# Patient Record
Sex: FEMALE | Race: WHITE | NOT HISPANIC OR LATINO | URBAN - METROPOLITAN AREA
[De-identification: names, ages, dates, MRNs, and addresses within clinical notes are randomized per-mention and may not be internally consistent; named-entity substitution may affect disease eponyms.]

---

## 2022-07-05 ENCOUNTER — EMERGENCY (EMERGENCY)
Facility: HOSPITAL | Age: 27
LOS: 1 days | Discharge: HOME | End: 2022-07-05
Attending: EMERGENCY MEDICINE | Admitting: EMERGENCY MEDICINE

## 2022-07-05 VITALS
TEMPERATURE: 98 F | RESPIRATION RATE: 16 BRPM | HEART RATE: 74 BPM | DIASTOLIC BLOOD PRESSURE: 87 MMHG | SYSTOLIC BLOOD PRESSURE: 142 MMHG | OXYGEN SATURATION: 98 %

## 2022-07-05 VITALS
WEIGHT: 125 LBS | RESPIRATION RATE: 18 BRPM | DIASTOLIC BLOOD PRESSURE: 84 MMHG | TEMPERATURE: 98 F | SYSTOLIC BLOOD PRESSURE: 136 MMHG | HEART RATE: 71 BPM | OXYGEN SATURATION: 100 %
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
## 2022-07-05 DIAGNOSIS — R51.9 HEADACHE, UNSPECIFIED: ICD-10-CM
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
## 2022-07-05 DIAGNOSIS — F07.81 POSTCONCUSSIONAL SYNDROME: ICD-10-CM

## 2022-07-05 DIAGNOSIS — Z91.010 ALLERGY TO PEANUTS: ICD-10-CM

## 2022-07-05 PROCEDURE — 99284 EMERGENCY DEPT VISIT MOD MDM: CPT

## 2022-07-05 PROCEDURE — 70450 CT HEAD/BRAIN W/O DYE: CPT | Mod: 26

## 2022-07-05 RX ORDER — ACETAMINOPHEN 500 MG
975 TABLET ORAL ONCE
Refills: 0 | Status: COMPLETED | OUTPATIENT
Start: 2022-07-05 | End: 2022-07-05

## 2022-07-05 RX ADMIN — Medication 975 MILLIGRAM(S): at 19:39

## 2022-07-05 NOTE — ED PROVIDER NOTE - PROGRESS NOTE DETAILS
Pt signed out to me pending CT head read:  CT head negative for bleed.  Stable for dc.  Veronika Dent MD

## 2022-07-05 NOTE — ED PROVIDER NOTE - PATIENT PORTAL LINK FT
You can access the FollowMyHealth Patient Portal offered by [facility] by registering at the following website: [email or web address]. By joining [facility]’s FollowMyHealth portal, you will also be able to view your health information using other applications (apps) compatible with our system.

## 2022-07-05 NOTE — ED ADULT NURSE NOTE - CHIEF COMPLAINT QUOTE
Continue taking your pain medications as prescribed.    Follow-up with Orthopedics and your pain management specialist as discussed.    Return to the emergency department for any new or worsening symptoms or as needed for any additional concerns.   pt c/o concussive symptoms s/p hitting her head 4 days ago

## 2022-07-05 NOTE — ED ADULT TRIAGE NOTE - GLASGOW COMA SCALE: EYE OPENING, MLM
How Severe Is Your Acne?: moderate Is This A New Presentation, Or A Follow-Up?: Acne (E4) spontaneous

## 2022-07-05 NOTE — ED PROVIDER NOTE - NSFOLLOWUPINSTRUCTIONS_ED_ALL_ED_FT
Concussion, Adult    A series of images showing how the quick head movements of a concussion injure the brain.   A concussion is a brain injury from a hard, direct hit (trauma) to the head or body. This direct hit causes the brain to shake quickly back and forth inside the skull. This can damage brain cells and cause chemical changes in the brain. A concussion may also be known as a mild traumatic brain injury (TBI).    Concussions are usually not life-threatening, but the effects of a concussion can be serious. If you have a concussion, you should be very careful to avoid having a second concussion.      What are the causes?    This condition is caused by:•A direct hit to your head, such as:  •Running into another player during a game.      •Being hit in a fight.      •Hitting your head on a hard surface.        •Sudden movement of your body that causes your brain to move back and forth inside the skull, such as in a car crash.        What are the signs or symptoms?    The signs of a concussion can be hard to notice. Early on, they may be missed by you, family members, and health care providers. You may look fine on the outside but may act or feel differently.    Every head injury is different. Symptoms are usually temporary but may last for days, weeks, or even months. Some symptoms appear right away, but other symptoms may not show up for hours or days. If your symptoms last longer than normal, you may have post-concussion syndrome.    Physical symptoms     •Headaches.      •Dizziness and problems with coordination or balance.      •Sensitivity to light or noise.      •Nausea or vomiting.      •Tiredness (fatigue).      •Vision or hearing problems.      •Changes in eating or sleeping patterns.      •Seizure.      Mental and emotional symptoms     •Irritability or mood changes.      •Memory problems.      •Trouble concentrating, organizing, or making decisions.      •Slowness in thinking, acting or reacting, speaking, or reading.      •Anxiety or depression.        How is this diagnosed?    This condition is diagnosed based on:  •Your symptoms.      •A description of your injury.      You may also have tests, including:  •Imaging tests, such as a CT scan or an MRI.      •Neuropsychological tests. These measure your thinking, understanding, learning, and remembering abilities.        How is this treated?    Treatment for this condition includes:•Stopping sports or activity if you are injured. If you hit your head or show signs of concussion:   •Do not return to sports or activities the same day.       •Get checked by a health care provider before you return to your activities.        •Physical and mental rest and careful observation, usually at home. Gradually return to your normal activities.    •Medicines to help with symptoms such as headaches, nausea, or difficulty sleeping.  •Avoid taking opioid pain medicine while recovering from a concussion.        •Avoiding alcohol and drugs. These may slow your recovery and can put you at risk of further injury.      •Referral to a concussion clinic or rehabilitation center.      Recovery from a concussion can take time. How fast you recover depends on many factors. Return to activities only when:  •Your symptoms are completely gone.      •Your health care provider says that it is safe.        Follow these instructions at home:    Activity   •Limit activities that require a lot of thought or concentration, such as:  •Doing homework or job-related work.      •Watching TV.      •Working on the computer or phone.      •Playing memory games and puzzles.      •Rest. Rest helps your brain heal. Make sure you:  •Get plenty of sleep. Most adults should get 7–9 hours of sleep each night.      •Rest during the day. Take naps or rest breaks when you feel tired.        •Avoid physical activity like exercise until your health care provider says it is safe. Stop any activity that worsens symptoms.      • Do not do high-risk activities that could cause a second concussion, such as riding a bike or playing sports.      •Ask your health care provider when you can return to your normal activities, such as school, work, athletics, and driving. Your ability to react may be slower after a brain injury. Never do these activities if you are dizzy. Your health care provider will likely give you a plan for gradually returning to activities.        General instructions   A bottle of beer, a glass of wine, and a glass of hard liquor with a "do not drink" sign over them.    •Take over-the-counter and prescription medicines only as told by your health care provider. Some medicines, such as blood thinners (anticoagulants) and aspirin, may increase the risk for complications, such as bleeding.      • Do not drink alcohol until your health care provider says you can.      •Watch your symptoms and tell others around you to do the same. Complications sometimes occur after a concussion. Older adults with a brain injury may have a higher risk of serious complications.      •Tell your , teachers, school nurse, school counselor, , or  about your injury, symptoms, and restrictions.      •Keep all follow-up visits as told by your health care provider. This is important.        How is this prevented?    Avoiding another brain injury is very important. In rare cases, another injury can lead to permanent brain damage, brain swelling, or death. The risk of this is greatest during the first 7–10 days after a head injury. Avoid injuries by:  •Stopping activities that could lead to a second concussion, such as contact or recreational sports, until your health care provider says it is okay.    •Taking these actions once you have returned to sports or activities:  •Avoiding plays or moves that can cause you to crash into another person. This is how most concussions occur.      •Following the rules and being respectful of other players. Do not engage in violent or illegal plays.        •Getting regular exercise that includes strength and balance training.      •Wearing a properly fitting helmet during sports, biking, or other activities. Helmets can help protect you from serious skull and brain injuries, but they may not protect you from a concussion. Even when wearing a helmet, you should avoid being hit in the head.        Contact a health care provider if:    •Your symptoms do not improve.      •You have new symptoms.      •You have another injury.        Get help right away if:  •You have new or worsening physical symptoms, such as:  •A severe or worsening headache.      •Weakness or numbness in any part of your body, slurred speech, vision changes, or confusion.      •Your coordination gets worse.      •Vomiting repeatedly.      •You have a seizure.      •You have unusual behavior changes.      •You lose consciousness, are sleepier than normal, or are difficult to wake up.        These symptoms may represent a serious problem that is an emergency. Do not wait to see if the symptoms will go away. Get medical help right away. Call your local emergency services (911 in the [place] Do not drive yourself to the hospital.       Summary    •A concussion is a brain injury that results from a hard, direct hit (trauma) to your head or body.      •You may have imaging tests and neuropsychological tests to diagnose a concussion.      •Treatment for this condition includes physical and mental rest and careful observation.      •Ask your health care provider when you can return to your normal activities, such as school, work, athletics, and driving.       •Get help right away if you have a severe headache, weakness in any part of the body, seizures, behavior changes, changes in vision, or if you are confused or sleepier than normal.

## 2022-07-05 NOTE — ED PROVIDER NOTE - PHYSICAL EXAMINATION
VITAL SIGNS: I have reviewed nursing notes and confirm.  CONSTITUTIONAL: Well-developed; well-nourished; in no acute distress.  SKIN: Skin exam is warm and dry, no acute rash.  HEAD: Normocephalic; + mild occipital TTP w/out skeletal abnormalities or ecchymoses/hematoma  EYES: PERRL, EOM intact; conjunctiva and sclera clear.  ENT: No nasal discharge; airway clear.  NECK: Supple; non tender.  CARD:  Regular rate and rhythm.  RESP: Unlabored.   EXT: No deformities  NEURO: Alert, oriented. Grossly unremarkable.  PSYCH: Cooperative, appropriate.

## 2022-07-05 NOTE — ED ADULT NURSE NOTE - CAS DISCH TRANSFER METHOD
Called the patient and informed them of upcoming appointment. Patient was asked to arrive 20 minutes early in the ortho department to obtain x-rays.      Walking

## 2022-07-05 NOTE — ED ADULT NURSE NOTE - OBJECTIVE STATEMENT
pt. c/o feeling of dizziness and headache on and off since hitting her head on Thursday 6/30. Pt. denies LOC, nausea or vomiting.

## 2022-07-05 NOTE — ED PROVIDER NOTE - OBJECTIVE STATEMENT
27 y/o F w/no sig pmhx p/w occipital HA and intermittent dizziness/lightheadedness with some photosensitivity ongoing for 2 days after a head injury in which pt fell backward hitting the back of her head on a metal railing walking into an apt building. No LOC. No vomiting. Pt's sx began to manifest about 24hrs after the injury. Pt discussed with her PMD and was advised to report to the ER for CT and evaluation. No focal numbness/tingling/weakness. No neck pain. No other injuries.

## 2022-07-05 NOTE — ED PROVIDER NOTE - CLINICAL SUMMARY MEDICAL DECISION MAKING FREE TEXT BOX
Pending CT Head. Pt is neuro intact, well appearing. discussed signs/sx of concussion and duration of syndrome. given f/u with neuro and anticipatory guidance/supportive care instructions.

## 2022-07-05 NOTE — ED PROVIDER NOTE - HISTORY ATTESTATION, MLM
The patient noted   4  months   ago   the onset of   moderate and 5 out of 10  gnawing and crampy  left lower quadrant  pain   that radiated to the    and that lasted   15  minutes  .  Pain usually starts   .  It was triggered by   and relieved with   .  Since then similar episodes have occurred    times   per    and lasted   .  The subsequent episodes were triggered by   and relieved by   .  The patient was previously treated with  . The current treatment includes    The patient noted the onset of constipation  5  years   ago    which started   gradually  .  Regular bowel habits were   1   times   per    day  .   Currently the patient evacuates every   2      week  .    The stools are  hard  ,   brown   and   contain no blood  .   Associated findings include   narcotic pain pills  .    The patient was previously treated with  stool softeners and dulcolax   . The patient is currently being treated with    The patient noted the onset of heartburn   5  years   ago.   Symptoms initially occured   1   times   per   day  .   Initially the patient took   prescription PPI drugs  with   good   relief  .    Heartburn now occurs    times   per   .   The patient now takes    with    relief  .   Symptoms are worsened by   .   Associated symptoms are    .   Associated findings include   . 
I have reviewed and confirmed nurses' notes...

## 2022-07-05 NOTE — ED PROVIDER NOTE - CARE PROVIDER_API CALL
Corby Schneider)  Neurology; Neuromuscular Medicine  [address], [address] [place] [address]  Phone: [phone]  Fax: [phone]  Follow Up Time:

## 2023-02-24 ENCOUNTER — NON-APPOINTMENT (OUTPATIENT)
Age: 28
End: 2023-02-24

## 2023-02-25 ENCOUNTER — EMERGENCY (EMERGENCY)
Facility: HOSPITAL | Age: 28
LOS: 1 days | Discharge: HOME | End: 2023-02-25
Attending: EMERGENCY MEDICINE | Admitting: EMERGENCY MEDICINE
Payer: COMMERCIAL

## 2023-02-25 VITALS
WEIGHT: 125 LBS | RESPIRATION RATE: 18 BRPM | HEART RATE: 135 BPM | DIASTOLIC BLOOD PRESSURE: 82 MMHG | SYSTOLIC BLOOD PRESSURE: 112 MMHG | OXYGEN SATURATION: 98 % | HEIGHT: 65 IN | TEMPERATURE: 99 F

## 2023-02-25 VITALS
SYSTOLIC BLOOD PRESSURE: 102 MMHG | OXYGEN SATURATION: 98 % | DIASTOLIC BLOOD PRESSURE: 68 MMHG | HEART RATE: 113 BPM | TEMPERATURE: 99 F | RESPIRATION RATE: 18 BRPM

## 2023-02-25 LAB
ALBUMIN SERPL ELPH-MCNC: 3.7 G/DL — SIGNIFICANT CHANGE UP (ref 3.4–5)
ALP SERPL-CCNC: 50 U/L — SIGNIFICANT CHANGE UP (ref 40–120)
ALT FLD-CCNC: 14 U/L — SIGNIFICANT CHANGE UP (ref 12–42)
ANION GAP SERPL CALC-SCNC: 10 MMOL/L — SIGNIFICANT CHANGE UP (ref 9–16)
APTT BLD: 25.5 SEC — LOW (ref 27.5–35.5)
AST SERPL-CCNC: 17 U/L — SIGNIFICANT CHANGE UP (ref 15–37)
BASOPHILS # BLD AUTO: 0.01 K/UL — SIGNIFICANT CHANGE UP (ref 0–0.2)
BASOPHILS NFR BLD AUTO: 0.1 % — SIGNIFICANT CHANGE UP (ref 0–2)
BILIRUB SERPL-MCNC: 0.5 MG/DL — SIGNIFICANT CHANGE UP (ref 0.2–1.2)
BUN SERPL-MCNC: 16 MG/DL — SIGNIFICANT CHANGE UP (ref 7–23)
CALCIUM SERPL-MCNC: 8.9 MG/DL — SIGNIFICANT CHANGE UP (ref 8.5–10.5)
CHLORIDE SERPL-SCNC: 100 MMOL/L — SIGNIFICANT CHANGE UP (ref 96–108)
CO2 SERPL-SCNC: 24 MMOL/L — SIGNIFICANT CHANGE UP (ref 22–31)
CREAT SERPL-MCNC: 0.75 MG/DL — SIGNIFICANT CHANGE UP (ref 0.5–1.3)
EGFR: 113 ML/MIN/1.73M2 — SIGNIFICANT CHANGE UP
EOSINOPHIL # BLD AUTO: 0 K/UL — SIGNIFICANT CHANGE UP (ref 0–0.5)
EOSINOPHIL NFR BLD AUTO: 0 % — SIGNIFICANT CHANGE UP (ref 0–6)
EPEC DNA STL QL NAA+PROBE: DETECTED
GI PCR PANEL: DETECTED
GLUCOSE SERPL-MCNC: 114 MG/DL — HIGH (ref 70–99)
HCG SERPL-ACNC: <1 MIU/ML — SIGNIFICANT CHANGE UP
HCT VFR BLD CALC: 41.6 % — SIGNIFICANT CHANGE UP (ref 34.5–45)
HGB BLD-MCNC: 14 G/DL — SIGNIFICANT CHANGE UP (ref 11.5–15.5)
HIV 1 & 2 AB SERPL IA.RAPID: SIGNIFICANT CHANGE UP
IMM GRANULOCYTES NFR BLD AUTO: 0.3 % — SIGNIFICANT CHANGE UP (ref 0–0.9)
INR BLD: 1.17 — HIGH (ref 0.88–1.16)
LIDOCAIN IGE QN: 50 U/L — LOW (ref 73–393)
LYMPHOCYTES # BLD AUTO: 0.51 K/UL — LOW (ref 1–3.3)
LYMPHOCYTES # BLD AUTO: 6.8 % — LOW (ref 13–44)
MAGNESIUM SERPL-MCNC: 1.5 MG/DL — LOW (ref 1.6–2.6)
MCHC RBC-ENTMCNC: 30.3 PG — SIGNIFICANT CHANGE UP (ref 27–34)
MCHC RBC-ENTMCNC: 33.7 GM/DL — SIGNIFICANT CHANGE UP (ref 32–36)
MCV RBC AUTO: 90 FL — SIGNIFICANT CHANGE UP (ref 80–100)
MONOCYTES # BLD AUTO: 0.33 K/UL — SIGNIFICANT CHANGE UP (ref 0–0.9)
MONOCYTES NFR BLD AUTO: 4.4 % — SIGNIFICANT CHANGE UP (ref 2–14)
NEUTROPHILS # BLD AUTO: 6.64 K/UL — SIGNIFICANT CHANGE UP (ref 1.8–7.4)
NEUTROPHILS NFR BLD AUTO: 88.4 % — HIGH (ref 43–77)
NOROVIRUS GI+II RNA STL QL NAA+NON-PROBE: DETECTED
NRBC # BLD: 0 /100 WBCS — SIGNIFICANT CHANGE UP (ref 0–0)
PLATELET # BLD AUTO: 208 K/UL — SIGNIFICANT CHANGE UP (ref 150–400)
POTASSIUM SERPL-MCNC: 3.6 MMOL/L — SIGNIFICANT CHANGE UP (ref 3.5–5.3)
POTASSIUM SERPL-SCNC: 3.6 MMOL/L — SIGNIFICANT CHANGE UP (ref 3.5–5.3)
PROT SERPL-MCNC: 7.9 G/DL — SIGNIFICANT CHANGE UP (ref 6.4–8.2)
PROTHROM AB SERPL-ACNC: 13.6 SEC — HIGH (ref 10.5–13.4)
RBC # BLD: 4.62 M/UL — SIGNIFICANT CHANGE UP (ref 3.8–5.2)
RBC # FLD: 12.3 % — SIGNIFICANT CHANGE UP (ref 10.3–14.5)
SODIUM SERPL-SCNC: 134 MMOL/L — SIGNIFICANT CHANGE UP (ref 132–145)
WBC # BLD: 7.51 K/UL — SIGNIFICANT CHANGE UP (ref 3.8–10.5)
WBC # FLD AUTO: 7.51 K/UL — SIGNIFICANT CHANGE UP (ref 3.8–10.5)

## 2023-02-25 PROCEDURE — 99285 EMERGENCY DEPT VISIT HI MDM: CPT

## 2023-02-25 PROCEDURE — 74177 CT ABD & PELVIS W/CONTRAST: CPT | Mod: 26

## 2023-02-25 RX ORDER — CEFPODOXIME PROXETIL 100 MG
200 TABLET ORAL ONCE
Refills: 0 | Status: COMPLETED | OUTPATIENT
Start: 2023-02-25 | End: 2023-02-25

## 2023-02-25 RX ORDER — MORPHINE SULFATE 50 MG/1
4 CAPSULE, EXTENDED RELEASE ORAL ONCE
Refills: 0 | Status: DISCONTINUED | OUTPATIENT
Start: 2023-02-25 | End: 2023-02-25

## 2023-02-25 RX ORDER — KETOROLAC TROMETHAMINE 30 MG/ML
15 SYRINGE (ML) INJECTION ONCE
Refills: 0 | Status: DISCONTINUED | OUTPATIENT
Start: 2023-02-25 | End: 2023-02-25

## 2023-02-25 RX ORDER — ONDANSETRON 8 MG/1
4 TABLET, FILM COATED ORAL ONCE
Refills: 0 | Status: DISCONTINUED | OUTPATIENT
Start: 2023-02-25 | End: 2023-03-01

## 2023-02-25 RX ORDER — SODIUM CHLORIDE 9 MG/ML
1000 INJECTION INTRAMUSCULAR; INTRAVENOUS; SUBCUTANEOUS
Refills: 0 | Status: DISCONTINUED | OUTPATIENT
Start: 2023-02-25 | End: 2023-03-01

## 2023-02-25 RX ORDER — CEFPODOXIME PROXETIL 100 MG
1 TABLET ORAL
Qty: 20 | Refills: 0
Start: 2023-02-25 | End: 2023-03-06

## 2023-02-25 RX ORDER — ONDANSETRON 8 MG/1
1 TABLET, FILM COATED ORAL
Qty: 20 | Refills: 0
Start: 2023-02-25

## 2023-02-25 RX ORDER — METRONIDAZOLE 500 MG
1 TABLET ORAL
Qty: 21 | Refills: 0
Start: 2023-02-25 | End: 2023-03-03

## 2023-02-25 RX ORDER — METRONIDAZOLE 500 MG
500 TABLET ORAL ONCE
Refills: 0 | Status: COMPLETED | OUTPATIENT
Start: 2023-02-25 | End: 2023-02-25

## 2023-02-25 RX ORDER — MAGNESIUM SULFATE 500 MG/ML
2 VIAL (ML) INJECTION ONCE
Refills: 0 | Status: COMPLETED | OUTPATIENT
Start: 2023-02-25 | End: 2023-02-25

## 2023-02-25 RX ORDER — ONDANSETRON 8 MG/1
4 TABLET, FILM COATED ORAL ONCE
Refills: 0 | Status: COMPLETED | OUTPATIENT
Start: 2023-02-25 | End: 2023-02-25

## 2023-02-25 RX ORDER — SODIUM CHLORIDE 9 MG/ML
1000 INJECTION INTRAMUSCULAR; INTRAVENOUS; SUBCUTANEOUS ONCE
Refills: 0 | Status: COMPLETED | OUTPATIENT
Start: 2023-02-25 | End: 2023-02-25

## 2023-02-25 RX ORDER — CEFTRIAXONE 500 MG/1
1000 INJECTION, POWDER, FOR SOLUTION INTRAMUSCULAR; INTRAVENOUS ONCE
Refills: 0 | Status: COMPLETED | OUTPATIENT
Start: 2023-02-25 | End: 2023-02-25

## 2023-02-25 RX ADMIN — SODIUM CHLORIDE 1000 MILLILITER(S): 9 INJECTION INTRAMUSCULAR; INTRAVENOUS; SUBCUTANEOUS at 13:25

## 2023-02-25 RX ADMIN — MORPHINE SULFATE 4 MILLIGRAM(S): 50 CAPSULE, EXTENDED RELEASE ORAL at 17:04

## 2023-02-25 RX ADMIN — Medication 100 MILLIGRAM(S): at 13:25

## 2023-02-25 RX ADMIN — ONDANSETRON 4 MILLIGRAM(S): 8 TABLET, FILM COATED ORAL at 13:26

## 2023-02-25 RX ADMIN — Medication 200 MILLIGRAM(S): at 19:36

## 2023-02-25 RX ADMIN — SODIUM CHLORIDE 150 MILLILITER(S): 9 INJECTION INTRAMUSCULAR; INTRAVENOUS; SUBCUTANEOUS at 16:04

## 2023-02-25 RX ADMIN — Medication 15 MILLIGRAM(S): at 16:04

## 2023-02-25 RX ADMIN — Medication 25 GRAM(S): at 14:37

## 2023-02-25 RX ADMIN — MORPHINE SULFATE 4 MILLIGRAM(S): 50 CAPSULE, EXTENDED RELEASE ORAL at 16:04

## 2023-02-25 RX ADMIN — Medication 500 MILLIGRAM(S): at 19:36

## 2023-02-25 RX ADMIN — CEFTRIAXONE 100 MILLIGRAM(S): 500 INJECTION, POWDER, FOR SOLUTION INTRAMUSCULAR; INTRAVENOUS at 13:25

## 2023-02-25 RX ADMIN — Medication 15 MILLIGRAM(S): at 17:05

## 2023-02-25 RX ADMIN — SODIUM CHLORIDE 150 MILLILITER(S): 9 INJECTION INTRAMUSCULAR; INTRAVENOUS; SUBCUTANEOUS at 18:18

## 2023-02-25 RX ADMIN — SODIUM CHLORIDE 1000 MILLILITER(S): 9 INJECTION INTRAMUSCULAR; INTRAVENOUS; SUBCUTANEOUS at 18:18

## 2023-02-25 NOTE — ED PROVIDER NOTE - NSFOLLOWUPINSTRUCTIONS_ED_ALL_ED_FT
-PLEASE FOLLOW-UP WITH YOUR PRIMARY CARE DOCTOR AND A GI SPECIALIST.  SEE THIS PAGE FOR REFERRALS.   -TAKE OVER THE COUNTER TYLENOL 650MG BY MOUTH EVERY 4-6 HOURS AS NEEDED FOR PAIN.  DO NOT MIX WITH ALCOHOL OR OTHER PRESCRIPTION MEDICATIONS THAT ALREADY CONTAIN TYLENOL OR ACETAMINOPHEN.   -PLEASE GO TO YOUR PHARMACY TO FILL YOUR PRESCRIPTIONS.  PLEASE START TOMORROW AND USE AS DIRECTED.  -PLEASE RETURN TO THE ER IMMEDIATELY OR CALL 911 FOR ANY HIGH FEVER, TROUBLE BREATHING, VOMITING, SEVERE PAIN, OR ANY OTHER CONCERNS.      Colitis       Colitis is a condition in which the colon is inflamed. It can cause diarrhea, blood in the stool, and abdominal pain. Colitis can last a short time (be acute), or it may last a long time (become chronic).      What are the causes?    This condition may be caused by:  •Infections from viruses or bacteria.      •A reaction to medicine.      •Certain autoimmune diseases, such as Crohn's disease or ulcerative colitis.      •Radiation treatment.      •Decreased blood flow to the bowel (ischemia).        What are the signs or symptoms?    Symptoms of this condition include:  •Diarrhea, blood in the stool, or black, tarry stool.      •Pain in the joints or abdominal pain.      •Fever or fatigue.      •Vomiting.      •Weight loss.      •Bloating.      •Having fewer bowel movements than usual.      •A strong and sudden urge to have a bowel movement.      •Feeling like the bowel is not empty after a bowel movement.        How is this diagnosed?    This condition may be diagnosed based on a stool test and a blood test. You may also have other tests, such as:  •X-rays.      •CT scan.      •Colonoscopy.      •Endoscopy.      •Biopsy.        How is this treated?    Treatment for this condition depends on the cause. This condition may be treated with:  •Steps to rest the bowel, such as not eating or drinking for a period of time.      •Fluids that are given through an IV.      •Medicine for pain and diarrhea.      •Antibiotic medicines.      •Cortisone medicines.      •Surgery.        Follow these instructions at home:      Eating and drinking      •Follow instructions from your health care provider about eating or drinking restrictions.      •Drink enough fluid to keep your urine pale yellow.      •Work with a dietitian to determine whether certain foods cause your condition to flare up.      •Avoid foods or drinks that cause flare-ups.      •Eat a well-balanced diet.      General instructions     •If you were prescribed an antibiotic medicine, take it as told by your health care provider. Do not stop taking the antibiotic even if you start to feel better.      •Take over-the-counter and prescription medicines only as told by your health care provider.      •Keep all follow-up visits. This is important.        Contact a health care provider if:    •Your symptoms do not go away.      •You develop new symptoms.        Get help right away if:    •You have a fever that does not go away with treatment.      •You develop chills.      •You have extreme weakness, fainting, or dehydration.      •You vomit repeatedly.      •You develop severe pain in your abdomen.      •You pass bloody or tarry stool.        Summary    •Colitis is a condition in which the colon is inflamed. Colitis can last a short time (be acute), or it may last a long time (become chronic).      •Treatment for this condition depends on the cause and may include resting the bowel, taking medicines, or having surgery.      •If you were prescribed an antibiotic medicine, take it as told by your health care provider. Do not stop taking the antibiotic even if you start to feel better.      •Get help right away if you develop severe pain in your abdomen.      •Keep all follow-up visits. This is important.      This information is not intended to replace advice given to you by your health care provider. Make sure you discuss any questions you have with your health care provider.

## 2023-02-25 NOTE — ED PROVIDER NOTE - CARE PROVIDER_API CALL
Ron Jorgensen)  Gastroenterology; Internal Medicine  [address] [address]  Phone: [phone]  Fax: [phone]  Follow Up Time: 1-3 Days    Darlene Espitia; MPH)  Internal Medicine  [address] [address]  Phone: [phone]  Fax: [phone]  Follow Up Time: 1-3 Days

## 2023-02-25 NOTE — ED PROVIDER NOTE - OBJECTIVE STATEMENT
Pt is a 28yo F with a h/o anxiety, depression, ADD, and p/w ~1 day of N/V/D.  Sxs started last night.  REports feeling nauseated and dizzy while taking a shower.  This was quickly followed by two bouts of watery diarrhea and vomiting.  Pt had several episodes of both overnight.  Pt noted stool turned bloody overnight and demonstrates photos of blood mixed stool.  Sxs associated with generalized abd pain, cramp like and localized to lower abd, L>R.  Pt admits she is very anxious over her sxs and feels dehydrated as she is having trouble keeping liquids down.  No recent abx use.   PMH - as above  Meds - Prozac, Focalin, OCP  Allergies - only penuts, NKDA  PSH - rhinoplasty  Social - denies tobacco, alcohol, drugs  Recent travel - reports a trip to [place] but almost 2 months ago.  No GI sxs after that trip.

## 2023-02-25 NOTE — ED ADULT TRIAGE NOTE - CHIEF COMPLAINT QUOTE
Pt walks in c/o generalized abdominal pain, vomiting, and bloody diarrhea since last night. Pt sent from . Pt noted to be tachycardic in triage, states she is slightly dizzy. aLMP unknown.

## 2023-02-25 NOTE — ED PROVIDER NOTE - CLINICAL SUMMARY MEDICAL DECISION MAKING FREE TEXT BOX
Presentation is consistent with bacterial colitis.  Given the bloody diarrhea we will start antibiotics.  We will give supportive care including IV hydration and Zofran.  We will check labs and re-evaluate.

## 2023-02-25 NOTE — ED PROVIDER NOTE - CARE PROVIDERS DIRECT ADDRESSES
[email or web address]

## 2023-02-25 NOTE — ED PROVIDER NOTE - PROVIDER TOKENS
PROVIDER:[TOKEN:[[id number]],FOLLOWUP:[1-3 Days]],PROVIDER:[TOKEN:[[id number]],FOLLOWUP:[1-3 Days]]

## 2023-02-25 NOTE — ED PROVIDER NOTE - PATIENT PORTAL LINK FT
You can access the FollowMyHealth Patient Portal offered by [facility] by registering at the following website: [email or web address]. By joining [employer]’s FollowMyHealth portal, you will also be able to view your health information using other applications (apps) compatible with our system.

## 2023-02-25 NOTE — ED PROVIDER NOTE - PHYSICAL EXAMINATION
VITAL SIGNS: I have reviewed nursing notes and confirm.  CONSTITUTIONAL: Well-developed; well-nourished; anxious and tearful but consolable.   SKIN: Skin is warm and dry, no acute rash.  HEAD: Normocephalic; atraumatic.  EYES: PERRL, EOM intact; conjunctiva and sclera clear.  ENT: No nasal discharge; airway clear.  NECK: Supple; non tender.  CARD: S1, S2 normal; no murmurs, gallops, or rubs. Mild regular tachycardia, ~105.   RESP: No wheezes, rales or rhonchi.  ABD: +Hyperactive BS.  Mild TTP throughout lower abd but no localization of pain.  No guarding, no rigidity, no distention, no rebound.  Negative Sofia's.   MSK: Normal ROM. No clubbing, cyanosis or edema.  NEURO: Alert, oriented. Grossly unremarkable.  PSYCH: Cooperative, appropriate.

## 2023-02-27 DIAGNOSIS — K52.9 NONINFECTIVE GASTROENTERITIS AND COLITIS, UNSPECIFIED: ICD-10-CM

## 2023-02-27 DIAGNOSIS — F41.8 OTHER SPECIFIED ANXIETY DISORDERS: ICD-10-CM

## 2023-02-27 DIAGNOSIS — R00.0 TACHYCARDIA, UNSPECIFIED: ICD-10-CM

## 2023-02-27 DIAGNOSIS — Z91.010 ALLERGY TO PEANUTS: ICD-10-CM

## 2023-02-27 DIAGNOSIS — R11.2 NAUSEA WITH VOMITING, UNSPECIFIED: ICD-10-CM

## 2023-02-27 DIAGNOSIS — F98.8 OTHER SPECIFIED BEHAVIORAL AND EMOTIONAL DISORDERS WITH ONSET USUALLY OCCURRING IN CHILDHOOD AND ADOLESCENCE: ICD-10-CM

## 2023-02-27 RX ORDER — CIPROFLOXACIN LACTATE 400MG/40ML
1 VIAL (ML) INTRAVENOUS
Qty: 10 | Refills: 0
Start: 2023-02-27 | End: 2023-03-03

## 2023-05-31 ENCOUNTER — TRANSCRIPTION ENCOUNTER (OUTPATIENT)
Age: 28
End: 2023-05-31

## 2023-05-31 ENCOUNTER — APPOINTMENT (OUTPATIENT)
Dept: INTERNAL MEDICINE | Facility: CLINIC | Age: 28
End: 2023-05-31
Payer: COMMERCIAL

## 2023-05-31 ENCOUNTER — NON-APPOINTMENT (OUTPATIENT)
Age: 28
End: 2023-05-31

## 2023-05-31 VITALS
HEIGHT: 64 IN | OXYGEN SATURATION: 98 % | TEMPERATURE: 97.7 F | SYSTOLIC BLOOD PRESSURE: 119 MMHG | BODY MASS INDEX: 20.32 KG/M2 | DIASTOLIC BLOOD PRESSURE: 76 MMHG | WEIGHT: 119 LBS | HEART RATE: 76 BPM

## 2023-05-31 DIAGNOSIS — F41.9 ANXIETY DISORDER, UNSPECIFIED: ICD-10-CM

## 2023-05-31 DIAGNOSIS — E73.9 LACTOSE INTOLERANCE, UNSPECIFIED: ICD-10-CM

## 2023-05-31 DIAGNOSIS — F32.A ANXIETY DISORDER, UNSPECIFIED: ICD-10-CM

## 2023-05-31 DIAGNOSIS — K62.5 HEMORRHAGE OF ANUS AND RECTUM: ICD-10-CM

## 2023-05-31 DIAGNOSIS — Z00.00 ENCOUNTER FOR GENERAL ADULT MEDICAL EXAMINATION W/OUT ABNORMAL FINDINGS: ICD-10-CM

## 2023-05-31 DIAGNOSIS — F98.8 OTHER SPECIFIED BEHAVIORAL AND EMOTIONAL DISORDERS WITH ONSET USUALLY OCCURRING IN CHILDHOOD AND ADOLESCENCE: ICD-10-CM

## 2023-05-31 DIAGNOSIS — Z71.85 ENCOUNTER FOR IMMUNIZATION SAFETY COUNSELING: ICD-10-CM

## 2023-05-31 DIAGNOSIS — Z80.8 FAMILY HISTORY OF MALIGNANT NEOPLASM OF OTHER ORGANS OR SYSTEMS: ICD-10-CM

## 2023-05-31 DIAGNOSIS — Z82.49 FAMILY HISTORY OF ISCHEMIC HEART DISEASE AND OTHER DISEASES OF THE CIRCULATORY SYSTEM: ICD-10-CM

## 2023-05-31 DIAGNOSIS — J30.2 OTHER SEASONAL ALLERGIC RHINITIS: ICD-10-CM

## 2023-05-31 DIAGNOSIS — Z83.3 FAMILY HISTORY OF DIABETES MELLITUS: ICD-10-CM

## 2023-05-31 DIAGNOSIS — Z86.79 PERSONAL HISTORY OF OTHER DISEASES OF THE CIRCULATORY SYSTEM: ICD-10-CM

## 2023-05-31 DIAGNOSIS — Z23 ENCOUNTER FOR IMMUNIZATION: ICD-10-CM

## 2023-05-31 DIAGNOSIS — K52.9 NONINFECTIVE GASTROENTERITIS AND COLITIS, UNSPECIFIED: ICD-10-CM

## 2023-05-31 DIAGNOSIS — Z83.438 FAMILY HISTORY OF OTHER DISORDER OF LIPOPROTEIN METABOLISM AND OTHER LIPIDEMIA: ICD-10-CM

## 2023-05-31 DIAGNOSIS — Z83.49 FAMILY HISTORY OF OTHER ENDOCRINE, NUTRITIONAL AND METABOLIC DISEASES: ICD-10-CM

## 2023-05-31 DIAGNOSIS — U07.1 COVID-19: ICD-10-CM

## 2023-05-31 DIAGNOSIS — K58.9 IRRITABLE BOWEL SYNDROME W/OUT DIARRHEA: ICD-10-CM

## 2023-05-31 PROCEDURE — 90471 IMMUNIZATION ADMIN: CPT

## 2023-05-31 PROCEDURE — 36415 COLL VENOUS BLD VENIPUNCTURE: CPT

## 2023-05-31 PROCEDURE — 90715 TDAP VACCINE 7 YRS/> IM: CPT

## 2023-05-31 PROCEDURE — 99385 PREV VISIT NEW AGE 18-39: CPT | Mod: 25

## 2023-05-31 RX ORDER — EPINEPHRINE 0.3 MG/.3ML
0.3 MG/0.3ML INJECTION INTRAMUSCULAR
Refills: 0 | Status: ACTIVE | COMMUNITY

## 2023-05-31 RX ORDER — FLUOXETINE HYDROCHLORIDE 10 MG/1
10 CAPSULE ORAL
Refills: 0 | Status: ACTIVE | COMMUNITY

## 2023-05-31 RX ORDER — DEXMETHYLPHENIDATE HYDROCHLORIDE 5 MG/1
5 TABLET ORAL
Refills: 0 | Status: ACTIVE | COMMUNITY

## 2023-05-31 RX ORDER — NORGESTIMATE AND ETHINYL ESTRADIOL 0.25-0.035
KIT ORAL
Refills: 0 | Status: ACTIVE | COMMUNITY

## 2023-05-31 NOTE — HEALTH RISK ASSESSMENT
[Yes] : Yes [Monthly or less (1 pt)] : Monthly or less (1 point) [1 or 2 (0 pts)] : 1 or 2 (0 points) [Never (0 pts)] : Never (0 points) [No] : In the past 12 months have you used drugs other than those required for medical reasons? No [No falls in past year] : Patient reported no falls in the past year [0] : 2) Feeling down, depressed, or hopeless: Not at all (0) [PHQ-2 Negative - No further assessment needed] : PHQ-2 Negative - No further assessment needed [No Retinopathy] : No retinopathy [Patient declined PAP Smear] : Patient declined PAP Smear [HIV test declined] : HIV test declined [Hepatitis C test declined] : Hepatitis C test declined [None] : None [Alone] : lives alone [Student] : student [Significant Other] : lives with significant other [Sexually Active] : sexually active [Feels Safe at Home] : Feels safe at home [Fully functional (bathing, dressing, toileting, transferring, walking, feeding)] : Fully functional (bathing, dressing, toileting, transferring, walking, feeding) [Fully functional (using the telephone, shopping, preparing meals, housekeeping, doing laundry, using] : Fully functional and needs no help or supervision to perform IADLs (using the telephone, shopping, preparing meals, housekeeping, doing laundry, using transportation, managing medications and managing finances) [Smoke Detector] : smoke detector [Carbon Monoxide Detector] : carbon monoxide detector [Seat Belt] :  uses seat belt [Sunscreen] : uses sunscreen [With Patient/Caregiver] : , with patient/caregiver [Name: ___] : Health Care Proxy's Name: [unfilled]  [Relationship: ___] : Relationship: [unfilled] [I will adhere to the patient's wishes.] : I will adhere to the patient's wishes. [Time Spent: ___ minutes] : Time Spent: [unfilled] minutes [[id number]] : 0 [EyeExamDate] : 7/1/2022 [Reports changes in hearing] : Reports no changes in hearing [Reports changes in vision] : Reports no changes in vision [Reports changes in dental health] : Reports no changes in dental health [Guns at Home] : no guns at home [PapSmearDate] : 9/2022 [AdvancecareDate] : 5/31/2023

## 2023-06-01 ENCOUNTER — TRANSCRIPTION ENCOUNTER (OUTPATIENT)
Age: 28
End: 2023-06-01

## 2023-06-01 LAB
25(OH)D3 SERPL-MCNC: 38.8 NG/ML
ALBUMIN SERPL ELPH-MCNC: 5 G/DL
ALP BLD-CCNC: 82 U/L
ALT SERPL-CCNC: 14 U/L
ANION GAP SERPL CALC-SCNC: 14 MMOL/L
AST SERPL-CCNC: 19 U/L
BILIRUB SERPL-MCNC: 0.2 MG/DL
BUN SERPL-MCNC: 11 MG/DL
CALCIUM SERPL-MCNC: 10.2 MG/DL
CHLORIDE SERPL-SCNC: 100 MMOL/L
CHOLEST SERPL-MCNC: 170 MG/DL
CO2 SERPL-SCNC: 24 MMOL/L
COVID-19 NUCLEOCAPSID  GAM ANTIBODY INTERPRETATION: POSITIVE
CREAT SERPL-MCNC: 0.66 MG/DL
EGFR: 124 ML/MIN/1.73M2
ESTIMATED AVERAGE GLUCOSE: 114 MG/DL
FOLATE SERPL-MCNC: 11.7 NG/ML
GLUCOSE SERPL-MCNC: 90 MG/DL
HBA1C MFR BLD HPLC: 5.6 %
HDLC SERPL-MCNC: 58 MG/DL
LDLC SERPL CALC-MCNC: 82 MG/DL
NONHDLC SERPL-MCNC: 112 MG/DL
POTASSIUM SERPL-SCNC: 4.1 MMOL/L
PROT SERPL-MCNC: 7.7 G/DL
SARS-COV-2 AB SERPL QL IA: 10.9 INDEX
SODIUM SERPL-SCNC: 138 MMOL/L
TRIGL SERPL-MCNC: 148 MG/DL
TSH SERPL-ACNC: 1.81 UIU/ML
VIT B12 SERPL-MCNC: 319 PG/ML

## 2023-08-11 ENCOUNTER — TRANSCRIPTION ENCOUNTER (OUTPATIENT)
Age: 28
End: 2023-08-11

## 2023-12-14 ENCOUNTER — NON-APPOINTMENT (OUTPATIENT)
Age: 28
End: 2023-12-14

## 2023-12-28 ENCOUNTER — NON-APPOINTMENT (OUTPATIENT)
Age: 28
End: 2023-12-28

## 2023-12-31 ENCOUNTER — NON-APPOINTMENT (OUTPATIENT)
Age: 28
End: 2023-12-31

## 2024-01-19 ENCOUNTER — APPOINTMENT (OUTPATIENT)
Dept: FAMILY MEDICINE | Facility: CLINIC | Age: 29
End: 2024-01-19
Payer: COMMERCIAL

## 2024-01-19 VITALS
BODY MASS INDEX: 22.02 KG/M2 | TEMPERATURE: 97.2 F | DIASTOLIC BLOOD PRESSURE: 79 MMHG | WEIGHT: 129 LBS | SYSTOLIC BLOOD PRESSURE: 112 MMHG | HEART RATE: 86 BPM | HEIGHT: 64 IN | OXYGEN SATURATION: 96 %

## 2024-01-19 PROCEDURE — 36415 COLL VENOUS BLD VENIPUNCTURE: CPT

## 2024-01-19 PROCEDURE — 99213 OFFICE O/P EST LOW 20 MIN: CPT

## 2024-01-19 RX ORDER — LORAZEPAM 2 MG/1
TABLET ORAL
Refills: 0 | Status: DISCONTINUED | COMMUNITY
End: 2024-01-19

## 2024-01-19 RX ORDER — CETIRIZINE HCL 10 MG
TABLET ORAL
Refills: 0 | Status: DISCONTINUED | COMMUNITY
End: 2024-01-19

## 2024-01-19 RX ORDER — FLUTICASONE PROPIONATE 50 MCG
50 SPRAY, SUSPENSION NASAL
Refills: 0 | Status: DISCONTINUED | COMMUNITY
End: 2024-01-19

## 2024-01-19 RX ORDER — OMEPRAZOLE 20 MG/1
20 CAPSULE, DELAYED RELEASE ORAL
Refills: 0 | Status: DISCONTINUED | COMMUNITY
End: 2024-01-19

## 2024-01-19 NOTE — REVIEW OF SYSTEMS
[Palpitations] : palpitations [Dizziness] : dizziness [Negative] : Psychiatric [Fever] : no fever [Chills] : no chills [Nasal Discharge] : no nasal discharge [Chest Pain] : no chest pain [Shortness Of Breath] : no shortness of breath [Headache] : no headache [Fainting] : no fainting

## 2024-01-19 NOTE — PHYSICAL EXAM
[No Lymphadenopathy] : no lymphadenopathy [Supple] : supple [Thyroid Normal, No Nodules] : the thyroid was normal and there were no nodules present [Coordination Grossly Intact] : coordination grossly intact [No Focal Deficits] : no focal deficits [Normal Gait] : normal gait [Normal] : affect was normal and insight and judgment were intact

## 2024-01-19 NOTE — HISTORY OF PRESENT ILLNESS
[FreeTextEntry1] : F/u [de-identified] : 27 yo female presents for concerns post covid. Patient states she was diagnosed with Covid around Dec 16, then 1 week later she was diagnosed with influenza A. States end of December she had an episode where her HR increased. She went to urgent care, EKG was WNL, discharged home. Patient states early Jan then it happened again and her HR was elevated, she went to ED, CXR and bloodwork was normal, states it may be POTS. States her HR ranges from 120s-150s when she stands up, monitors on her apple watch. Patient states she has an EPS specialist appt in March in [facility]. Patient just finished her menstrual cycle, states it was not heavy, WNL. [FreeTextEntry8] : 29 yo female presents for concerns of elevated heart rate and POTS. Patient states she was diagnosed with Covid around Dec 16, then 1 week later she was diagnosed with influenza A. States end of December she had an episode where her HR increased up to 130s-140s when she was stood up. She went to urgent care, EKG was WNL, discharged home. Patient states early Jan then it happened again and her HR was elevated again to 130s-150s, she went to ED, CXR and bloodwork was normal, states she was told it may be POTS and to follow up with a cardiologist. Patient states now every time she stands up her HR ranges from 120s-150s. States she monitors her HR on her apple watch. Along with the elevated HR she also feels lightheaded and experiences pre-syncopal symptoms. States she has never had an episode of syncope but tends to lay down when she feels like this. States her HR tends to improve after about 1 minute, she uses salt packets when her symptoms are particularly bad.  Patient just finished her menstrual cycle, states it was not heavy, WNL. Denies any abnormal bleeding, CP, SOB, congestion, abd pain, urinary issues. Denies known personal hx of thyroid dysfunction. Patient states she has an EP specialist appt in March in [facility].

## 2024-01-19 NOTE — HISTORY OF PRESENT ILLNESS
[FreeTextEntry1] : F/u [de-identified] : 29 yo female presents for concerns post covid. Patient states she was diagnosed with Covid around Dec 16, then 1 week later she was diagnosed with influenza A. States end of December she had an episode where her HR increased. She went to urgent care, EKG was WNL, discharged home. Patient states early Jan then it happened again and her HR was elevated, she went to ED, CXR and bloodwork was normal, states it may be POTS. States her HR ranges from 120s-150s when she stands up, monitors on her apple watch. Patient states she has an EPS specialist appt in March in [facility]. Patient just finished her menstrual cycle, states it was not heavy, WNL. [FreeTextEntry8] : 27 yo female presents for concerns of elevated heart rate and POTS. Patient states she was diagnosed with Covid around Dec 16, then 1 week later she was diagnosed with influenza A. States end of December she had an episode where her HR increased up to 130s-140s when she was stood up. She went to urgent care, EKG was WNL, discharged home. Patient states early Jan then it happened again and her HR was elevated again to 130s-150s, she went to ED, CXR and bloodwork was normal, states she was told it may be POTS and to follow up with a cardiologist. Patient states now every time she stands up her HR ranges from 120s-150s. States she monitors her HR on her apple watch. Along with the elevated HR she also feels lightheaded and experiences pre-syncopal symptoms. States she has never had an episode of syncope but tends to lay down when she feels like this. States her HR tends to improve after about 1 minute, she uses salt packets when her symptoms are particularly bad.  Patient just finished her menstrual cycle, states it was not heavy, WNL. Denies any abnormal bleeding, CP, SOB, congestion, abd pain, urinary issues. Denies known personal hx of thyroid dysfunction. Patient states she has an EP specialist appt in March in [facility].

## 2024-01-22 ENCOUNTER — NON-APPOINTMENT (OUTPATIENT)
Age: 29
End: 2024-01-22

## 2024-01-22 LAB
ALBUMIN SERPL ELPH-MCNC: 4.3 G/DL
ALP BLD-CCNC: 67 U/L
ALT SERPL-CCNC: 19 U/L
ANION GAP SERPL CALC-SCNC: 12 MMOL/L
AST SERPL-CCNC: 18 U/L
BASOPHILS # BLD AUTO: 0.02 K/UL
BASOPHILS NFR BLD AUTO: 0.3 %
BILIRUB SERPL-MCNC: <0.2 MG/DL
BUN SERPL-MCNC: 13 MG/DL
CALCIUM SERPL-MCNC: 9.4 MG/DL
CHLORIDE SERPL-SCNC: 104 MMOL/L
CO2 SERPL-SCNC: 25 MMOL/L
CREAT SERPL-MCNC: 0.6 MG/DL
EGFR: 126 ML/MIN/1.73M2
EOSINOPHIL # BLD AUTO: 0.19 K/UL
EOSINOPHIL NFR BLD AUTO: 3.1 %
GLUCOSE SERPL-MCNC: 91 MG/DL
HCT VFR BLD CALC: 40.3 %
HGB BLD-MCNC: 13 G/DL
IMM GRANULOCYTES NFR BLD AUTO: 0.3 %
LYMPHOCYTES # BLD AUTO: 3.21 K/UL
LYMPHOCYTES NFR BLD AUTO: 53.1 %
MAN DIFF?: NORMAL
MCHC RBC-ENTMCNC: 29.8 PG
MCHC RBC-ENTMCNC: 32.3 GM/DL
MCV RBC AUTO: 92.4 FL
MONOCYTES # BLD AUTO: 0.43 K/UL
MONOCYTES NFR BLD AUTO: 7.1 %
NEUTROPHILS # BLD AUTO: 2.17 K/UL
NEUTROPHILS NFR BLD AUTO: 36.1 %
PLATELET # BLD AUTO: 240 K/UL
POTASSIUM SERPL-SCNC: 4.2 MMOL/L
PROT SERPL-MCNC: 6.8 G/DL
RBC # BLD: 4.36 M/UL
RBC # FLD: 12.4 %
SODIUM SERPL-SCNC: 142 MMOL/L
TSH SERPL-ACNC: 2.18 UIU/ML
WBC # FLD AUTO: 6.04 K/UL

## 2024-01-23 ENCOUNTER — TRANSCRIPTION ENCOUNTER (OUTPATIENT)
Age: 29
End: 2024-01-23

## 2024-01-24 ENCOUNTER — NON-APPOINTMENT (OUTPATIENT)
Age: 29
End: 2024-01-24

## 2024-01-24 ENCOUNTER — APPOINTMENT (OUTPATIENT)
Dept: HEART AND VASCULAR | Facility: CLINIC | Age: 29
End: 2024-01-24
Payer: COMMERCIAL

## 2024-01-24 VITALS
HEIGHT: 64 IN | SYSTOLIC BLOOD PRESSURE: 123 MMHG | OXYGEN SATURATION: 99 % | BODY MASS INDEX: 21.89 KG/M2 | TEMPERATURE: 98 F | HEART RATE: 85 BPM | WEIGHT: 128.19 LBS | DIASTOLIC BLOOD PRESSURE: 80 MMHG

## 2024-01-24 PROCEDURE — 99203 OFFICE O/P NEW LOW 30 MIN: CPT | Mod: 25

## 2024-01-24 PROCEDURE — 93000 ELECTROCARDIOGRAM COMPLETE: CPT

## 2024-01-24 NOTE — HISTORY OF PRESENT ILLNESS
[FreeTextEntry1] : 28F w anxiety/depression and ADD, who had covid before xmas, flu end of Dec, and since the flu she had heart racing sensations - got presyncopal, feels lightheaded - never passed out - feels her heart racing. worse after sitting up from a lying position, and going up stairs - happens almost daily - went to [facility] ER, CXR ECG and blood work, sent home and rec to fu w cardiology - taking bar exam in Feb, takes ADD meds as needed only - apple watch recordings reviewed: all are sinus tach

## 2024-01-24 NOTE — REVIEW OF SYSTEMS
[Headache] : headache [Chest Discomfort] : chest discomfort [Palpitations] : palpitations [Negative] : Heme/Lymph

## 2024-01-24 NOTE — PHYSICAL EXAM
[Well Developed] : well developed [No Acute Distress] : no acute distress [Well Nourished] : well nourished [Normal Venous Pressure] : normal venous pressure [Normal Conjunctiva] : normal conjunctiva [No Carotid Bruit] : no carotid bruit [Normal S1, S2] : normal S1, S2 [No Murmur] : no murmur [No Rub] : no rub [No Gallop] : no gallop [Clear Lung Fields] : clear lung fields [Good Air Entry] : good air entry [No Respiratory Distress] : no respiratory distress  [Soft] : abdomen soft [Non Tender] : non-tender [No Masses/organomegaly] : no masses/organomegaly [Normal Bowel Sounds] : normal bowel sounds [Normal Gait] : normal gait [No Cyanosis] : no cyanosis [No Edema] : no edema [No Clubbing] : no clubbing [No Varicosities] : no varicosities [No Rash] : no rash [No Skin Lesions] : no skin lesions [No Focal Deficits] : no focal deficits [Moves all extremities] : moves all extremities [Normal Speech] : normal speech [Alert and Oriented] : alert and oriented [Normal memory] : normal memory

## 2024-02-01 ENCOUNTER — APPOINTMENT (OUTPATIENT)
Dept: HEART AND VASCULAR | Facility: CLINIC | Age: 29
End: 2024-02-01
Payer: COMMERCIAL

## 2024-02-01 PROCEDURE — 93306 TTE W/DOPPLER COMPLETE: CPT

## 2024-02-14 ENCOUNTER — APPOINTMENT (OUTPATIENT)
Dept: HEART AND VASCULAR | Facility: CLINIC | Age: 29
End: 2024-02-14
Payer: COMMERCIAL

## 2024-02-14 VITALS
WEIGHT: 128 LBS | OXYGEN SATURATION: 97 % | DIASTOLIC BLOOD PRESSURE: 66 MMHG | HEART RATE: 95 BPM | HEIGHT: 64 IN | SYSTOLIC BLOOD PRESSURE: 99 MMHG | BODY MASS INDEX: 21.85 KG/M2

## 2024-02-14 PROCEDURE — 99214 OFFICE O/P EST MOD 30 MIN: CPT

## 2024-02-14 NOTE — HISTORY OF PRESENT ILLNESS
[FreeTextEntry1] : 28F w anxiety/depression and ADD, who had covid before xmas, flu end of Dec, and since the flu she had heart racing sensations  2/14 FU: still having significant sx and fast HRs - only been at home studying for bar exam in two weeks  - wore 1 wk MCOT at home. significant sx w standing up and moving rosalio home. HRs up to 140s (not exercising)  Previously reported sx: - got presyncopal, feels lightheaded - never passed out - feels her heart racing. worse after sitting up from a lying position, and going up stairs - went to [facility] ER, CXR ECG and blood work, sent home and rec to fu w cardiology

## 2024-02-14 NOTE — REASON FOR VISIT
[FreeTextEntry1] :   CV Data: ECG 1/24/24 normal sinus TTE 2/1/24: LVEF 69% normal RV, no valve dse MCOT 1/27 - 2/2/24: sinus tachycardia

## 2024-02-23 ENCOUNTER — TRANSCRIPTION ENCOUNTER (OUTPATIENT)
Age: 29
End: 2024-02-23

## 2024-03-12 ENCOUNTER — TRANSCRIPTION ENCOUNTER (OUTPATIENT)
Age: 29
End: 2024-03-12

## 2024-03-13 ENCOUNTER — TRANSCRIPTION ENCOUNTER (OUTPATIENT)
Age: 29
End: 2024-03-13

## 2024-03-24 ENCOUNTER — NON-APPOINTMENT (OUTPATIENT)
Age: 29
End: 2024-03-24

## 2024-04-02 ENCOUNTER — TRANSCRIPTION ENCOUNTER (OUTPATIENT)
Age: 29
End: 2024-04-02

## 2024-04-11 ENCOUNTER — TRANSCRIPTION ENCOUNTER (OUTPATIENT)
Age: 29
End: 2024-04-11

## 2024-04-11 RX ORDER — PROPRANOLOL HYDROCHLORIDE 10 MG/1
10 TABLET ORAL
Qty: 90 | Refills: 2 | Status: ACTIVE | COMMUNITY
Start: 2024-02-14 | End: 1900-01-01

## 2024-04-16 ENCOUNTER — TRANSCRIPTION ENCOUNTER (OUTPATIENT)
Age: 29
End: 2024-04-16

## 2024-04-18 ENCOUNTER — TRANSCRIPTION ENCOUNTER (OUTPATIENT)
Age: 29
End: 2024-04-18

## 2024-04-19 ENCOUNTER — TRANSCRIPTION ENCOUNTER (OUTPATIENT)
Age: 29
End: 2024-04-19

## 2024-05-08 ENCOUNTER — APPOINTMENT (OUTPATIENT)
Dept: HEART AND VASCULAR | Facility: CLINIC | Age: 29
End: 2024-05-08
Payer: COMMERCIAL

## 2024-05-08 PROCEDURE — 93015 CV STRESS TEST SUPVJ I&R: CPT

## 2024-05-22 ENCOUNTER — APPOINTMENT (OUTPATIENT)
Dept: HEART AND VASCULAR | Facility: CLINIC | Age: 29
End: 2024-05-22
Payer: COMMERCIAL

## 2024-05-22 VITALS
DIASTOLIC BLOOD PRESSURE: 73 MMHG | SYSTOLIC BLOOD PRESSURE: 110 MMHG | HEART RATE: 84 BPM | BODY MASS INDEX: 23.9 KG/M2 | WEIGHT: 140 LBS | HEIGHT: 64 IN | OXYGEN SATURATION: 99 %

## 2024-05-22 DIAGNOSIS — G90.1 FAMILIAL DYSAUTONOMIA [RILEY-DAY]: ICD-10-CM

## 2024-05-22 DIAGNOSIS — R00.2 PALPITATIONS: ICD-10-CM

## 2024-05-22 DIAGNOSIS — I47.9 PAROXYSMAL TACHYCARDIA, UNSPECIFIED: ICD-10-CM

## 2024-05-22 PROCEDURE — 99214 OFFICE O/P EST MOD 30 MIN: CPT

## 2024-05-22 PROCEDURE — G2211 COMPLEX E/M VISIT ADD ON: CPT | Mod: NC,1L

## 2024-05-22 NOTE — REASON FOR VISIT
[Symptom and Test Evaluation] : symptom and test evaluation [FreeTextEntry1] :   CV Data: ECG 1/24/24 normal sinus TTE 2/1/24: LVEF 69% normal RV, no valve dse MCOT 1/27 - 2/2/24: sinus tachycardia

## 2024-06-12 ENCOUNTER — NON-APPOINTMENT (OUTPATIENT)
Age: 29
End: 2024-06-12

## 2024-07-12 ENCOUNTER — NON-APPOINTMENT (OUTPATIENT)
Age: 29
End: 2024-07-12

## 2024-07-31 ENCOUNTER — TRANSCRIPTION ENCOUNTER (OUTPATIENT)
Age: 29
End: 2024-07-31

## 2024-08-02 ENCOUNTER — TRANSCRIPTION ENCOUNTER (OUTPATIENT)
Age: 29
End: 2024-08-02

## 2024-11-13 ENCOUNTER — NON-APPOINTMENT (OUTPATIENT)
Age: 29
End: 2024-11-13

## 2024-11-16 ENCOUNTER — NON-APPOINTMENT (OUTPATIENT)
Age: 29
End: 2024-11-16

## 2025-02-25 ENCOUNTER — EMERGENCY (EMERGENCY)
Facility: HOSPITAL | Age: 30
LOS: 1 days | Discharge: HOME | End: 2025-02-25
Attending: EMERGENCY MEDICINE | Admitting: EMERGENCY MEDICINE
Payer: COMMERCIAL

## 2025-02-25 VITALS
SYSTOLIC BLOOD PRESSURE: 135 MMHG | TEMPERATURE: 98 F | HEART RATE: 105 BPM | HEIGHT: 65 IN | DIASTOLIC BLOOD PRESSURE: 91 MMHG | RESPIRATION RATE: 16 BRPM | WEIGHT: 125 LBS | OXYGEN SATURATION: 98 %

## 2025-02-25 LAB
ALBUMIN SERPL ELPH-MCNC: 3.8 G/DL — SIGNIFICANT CHANGE UP (ref 3.4–5)
ALP SERPL-CCNC: 56 U/L — SIGNIFICANT CHANGE UP (ref 40–120)
ALT FLD-CCNC: 40 U/L — SIGNIFICANT CHANGE UP (ref 12–42)
ANION GAP SERPL CALC-SCNC: 10 MMOL/L — SIGNIFICANT CHANGE UP (ref 9–16)
AST SERPL-CCNC: 21 U/L — SIGNIFICANT CHANGE UP (ref 15–37)
BASOPHILS # BLD AUTO: 0.01 K/UL — SIGNIFICANT CHANGE UP (ref 0–0.2)
BASOPHILS NFR BLD AUTO: 0.1 % — SIGNIFICANT CHANGE UP (ref 0–2)
BILIRUB SERPL-MCNC: 0.4 MG/DL — SIGNIFICANT CHANGE UP (ref 0.2–1.2)
BUN SERPL-MCNC: 7 MG/DL — SIGNIFICANT CHANGE UP (ref 7–23)
CALCIUM SERPL-MCNC: 9.4 MG/DL — SIGNIFICANT CHANGE UP (ref 8.5–10.5)
CHLORIDE SERPL-SCNC: 107 MMOL/L — SIGNIFICANT CHANGE UP (ref 96–108)
CO2 SERPL-SCNC: 24 MMOL/L — SIGNIFICANT CHANGE UP (ref 22–31)
CREAT SERPL-MCNC: 0.8 MG/DL — SIGNIFICANT CHANGE UP (ref 0.5–1.3)
EGFR: 103 ML/MIN/1.73M2 — SIGNIFICANT CHANGE UP
EOSINOPHIL # BLD AUTO: 0.07 K/UL — SIGNIFICANT CHANGE UP (ref 0–0.5)
EOSINOPHIL NFR BLD AUTO: 0.9 % — SIGNIFICANT CHANGE UP (ref 0–6)
GLUCOSE SERPL-MCNC: 98 MG/DL — SIGNIFICANT CHANGE UP (ref 70–99)
HCG SERPL-ACNC: <1 MIU/ML — SIGNIFICANT CHANGE UP
HCT VFR BLD CALC: 38.7 % — SIGNIFICANT CHANGE UP (ref 34.5–45)
HGB BLD-MCNC: 13.1 G/DL — SIGNIFICANT CHANGE UP (ref 11.5–15.5)
IMM GRANULOCYTES # BLD AUTO: 0.01 K/UL — SIGNIFICANT CHANGE UP (ref 0–0.07)
IMM GRANULOCYTES NFR BLD AUTO: 0.1 % — SIGNIFICANT CHANGE UP (ref 0–0.9)
LIDOCAIN IGE QN: 27 U/L — SIGNIFICANT CHANGE UP (ref 16–77)
LYMPHOCYTES # BLD AUTO: 2.74 K/UL — SIGNIFICANT CHANGE UP (ref 1–3.3)
LYMPHOCYTES NFR BLD AUTO: 35.2 % — SIGNIFICANT CHANGE UP (ref 13–44)
MAGNESIUM SERPL-MCNC: 1.9 MG/DL — SIGNIFICANT CHANGE UP (ref 1.6–2.6)
MCHC RBC-ENTMCNC: 30 PG — SIGNIFICANT CHANGE UP (ref 27–34)
MCHC RBC-ENTMCNC: 33.9 G/DL — SIGNIFICANT CHANGE UP (ref 32–36)
MCV RBC AUTO: 88.6 FL — SIGNIFICANT CHANGE UP (ref 80–100)
MONOCYTES # BLD AUTO: 0.44 K/UL — SIGNIFICANT CHANGE UP (ref 0–0.9)
MONOCYTES NFR BLD AUTO: 5.6 % — SIGNIFICANT CHANGE UP (ref 2–14)
NEUTROPHILS # BLD AUTO: 4.52 K/UL — SIGNIFICANT CHANGE UP (ref 1.8–7.4)
NEUTROPHILS NFR BLD AUTO: 58.1 % — SIGNIFICANT CHANGE UP (ref 43–77)
NRBC # BLD AUTO: 0 K/UL — SIGNIFICANT CHANGE UP (ref 0–0)
NRBC # FLD: 0 K/UL — SIGNIFICANT CHANGE UP (ref 0–0)
NRBC BLD AUTO-RTO: 0 /100 WBCS — SIGNIFICANT CHANGE UP (ref 0–0)
PLATELET # BLD AUTO: 209 K/UL — SIGNIFICANT CHANGE UP (ref 150–400)
PMV BLD: 10.2 FL — SIGNIFICANT CHANGE UP (ref 7–13)
POTASSIUM SERPL-MCNC: 3.9 MMOL/L — SIGNIFICANT CHANGE UP (ref 3.5–5.3)
POTASSIUM SERPL-SCNC: 3.9 MMOL/L — SIGNIFICANT CHANGE UP (ref 3.5–5.3)
PROT SERPL-MCNC: 7.3 G/DL — SIGNIFICANT CHANGE UP (ref 6.4–8.2)
RBC # BLD: 4.37 M/UL — SIGNIFICANT CHANGE UP (ref 3.8–5.2)
RBC # FLD: 11.7 % — SIGNIFICANT CHANGE UP (ref 10.3–14.5)
SODIUM SERPL-SCNC: 141 MMOL/L — SIGNIFICANT CHANGE UP (ref 132–145)
WBC # BLD: 7.79 K/UL — SIGNIFICANT CHANGE UP (ref 3.8–10.5)
WBC # FLD AUTO: 7.79 K/UL — SIGNIFICANT CHANGE UP (ref 3.8–10.5)

## 2025-02-25 PROCEDURE — 99284 EMERGENCY DEPT VISIT MOD MDM: CPT

## 2025-02-25 RX ORDER — SODIUM CHLORIDE 9 G/ML
1000 INJECTION, SOLUTION INTRAVENOUS
Refills: 0 | Status: ACTIVE | OUTPATIENT
Start: 2025-02-25 | End: 2026-01-24

## 2025-02-25 RX ORDER — FAMOTIDINE 10 MG/ML
20 INJECTION INTRAVENOUS ONCE
Refills: 0 | Status: COMPLETED | OUTPATIENT
Start: 2025-02-25 | End: 2025-02-25

## 2025-02-25 RX ORDER — BACTERIOSTATIC SODIUM CHLORIDE 0.9 %
1000 VIAL (ML) INJECTION ONCE
Refills: 0 | Status: COMPLETED | OUTPATIENT
Start: 2025-02-25 | End: 2025-02-25

## 2025-02-25 RX ORDER — ONDANSETRON 4 MG/1
4 TABLET, ORALLY DISINTEGRATING ORAL ONCE
Refills: 0 | Status: COMPLETED | OUTPATIENT
Start: 2025-02-25 | End: 2025-02-25

## 2025-02-25 RX ADMIN — FAMOTIDINE 20 MILLIGRAM(S): 10 INJECTION INTRAVENOUS at 23:07

## 2025-02-25 RX ADMIN — Medication 1000 MILLILITER(S): at 23:07

## 2025-02-25 RX ADMIN — ONDANSETRON 4 MILLIGRAM(S): 4 TABLET, ORALLY DISINTEGRATING ORAL at 23:07

## 2025-02-25 RX ADMIN — SODIUM CHLORIDE 333 MILLILITER(S): 9 INJECTION, SOLUTION INTRAVENOUS at 23:07

## 2025-02-25 NOTE — ED ADULT TRIAGE NOTE - CHIEF COMPLAINT QUOTE
Pt, walk in for abd pain, n/v/d, dizziness x 4 weeks. Seen today at another ED and was DX with gastritis.

## 2025-02-25 NOTE — ED PROVIDER NOTE - NSFOLLOWUPINSTRUCTIONS_ED_ALL_ED_FT
Viral Gastroenteritis, Adult  Body outline showing the digestive tract, including the stomach, small intestine, and large intestine.  Viral gastroenteritis is also known as the stomach flu. This condition may affect your stomach, small intestine, and large intestine. It can cause sudden watery diarrhea, fever, and vomiting. This condition is caused by many different viruses. These viruses can be passed from person to person very easily (are contagious).    Diarrhea and vomiting can make you feel weak and cause you to become dehydrated. You may not be able to keep fluids down. Dehydration can make you tired and thirsty, cause you to have a dry mouth, and decrease how often you urinate. It is important to replace the fluids that you lose from diarrhea and vomiting.    What are the causes?  Gastroenteritis is caused by many viruses, including rotavirus and norovirus. Norovirus is the most common cause in adults. You can get sick after being exposed to the viruses from other people. You can also get sick by:  Eating food, drinking water, or touching a surface contaminated with one of these viruses.  Sharing utensils or other personal items with an infected person.  What increases the risk?  You are more likely to develop this condition if you:  Have a weak body defense system (immune system).  Live with one or more children who are younger than 2 years.  Live in a nursing home.  Travel on cruise ships.  What are the signs or symptoms?  Symptoms of this condition start suddenly 1–3 days after exposure to a virus. Symptoms may last for a few days or for as long as a week. Common symptoms include watery diarrhea and vomiting. Other symptoms include:  Fever.  Headache.  Fatigue.  Pain in the abdomen.  Chills.  Weakness.  Nausea.  Muscle aches.  Loss of appetite.  How is this diagnosed?  This condition is diagnosed with a medical history and physical exam. You may also have a stool test to check for viruses or other infections.    How is this treated?  This condition typically goes away on its own. The focus of treatment is to prevent dehydration and restore lost fluids (rehydration). This condition may be treated with:  An oral rehydration solution (ORS) to replace important salts and minerals (electrolytes) in your body. Take this if told by your health care provider. This is a drink that is sold at pharmacies and retail stores.  Medicines to help with your symptoms.  Probiotic supplements to reduce symptoms of diarrhea.  Fluids given through an IV, if dehydration is severe.  Older adults and people with other diseases or a weak immune system are at higher risk for dehydration.    Follow these instructions at home:  Eating and drinking    A comparison of three sample cups showing dark yellow, yellow, and pale yellow urine.  Take an ORS as told by your health care provider.  Drink clear fluids in small amounts as you are able. Clear fluids include:  Water.  Ice chips.  Diluted fruit juice.  Low-calorie sports drinks.  Drink enough fluid to keep your urine pale yellow.  Eat small amounts of healthy foods every 3–4 hours as you are able. This may include whole grains, fruits, vegetables, lean meats, and yogurt.  Avoid fluids that contain a lot of sugar or caffeine, such as energy drinks, sports drinks, and soda.  Avoid spicy or fatty foods.  Avoid alcohol.  General instructions    Washing hands with soap and water.  Wash your hands often, especially after having diarrhea or vomiting. If soap and water are not available, use hand .  Make sure that all people in your household wash their hands well and often.  Take over-the-counter and prescription medicines only as told by your health care provider.  Rest at home while you recover.  Watch your condition for any changes.  Take a warm bath to relieve any burning or pain from frequent diarrhea episodes.  Keep all follow-up visits. This is important.  Contact a health care provider if you:  Cannot keep fluids down.  Have symptoms that get worse.  Have new symptoms.  Feel light-headed or dizzy.  Have muscle cramps.  Get help right away if you:  Have chest pain.  Have trouble breathing or you are breathing very quickly.  Have a fast heartbeat.  Feel extremely weak or you faint.  Have a severe headache, a stiff neck, or both.  Have a rash.  Have severe pain, cramping, or bloating in your abdomen.  Have skin that feels cold and clammy.  Feel confused.  Have pain when you urinate.  Have signs of dehydration, such as:  Dark urine, very little urine, or no urine.  Cracked lips.  Dry mouth.  Sunken eyes.  Sleepiness.  Weakness.  Have signs of bleeding, such as:  Seeing blood in your vomit.  Having vomit that looks like coffee grounds.  Having bloody or black stools or stools that look like tar.  These symptoms may be an emergency. Get help right away. Call 911.  Do not wait to see if the symptoms will go away.  Do not drive yourself to the hospital.  Summary  Viral gastroenteritis is also known as the stomach flu. It can cause sudden watery diarrhea, fever, and vomiting.  This condition can be passed from person to person very easily (is contagious).  Take an oral rehydration solution (ORS) if told by your health care provider. This is a drink that is sold at pharmacies and retail stores.  Wash your hands often, especially after having diarrhea or vomiting. If soap and water are not available, use hand . FLORASTOR IS AN OPTION OF PROBIOTIC YOU CAN GET IN THE PHARMACY.     Viral Gastroenteritis, Adult  Body outline showing the digestive tract, including the stomach, small intestine, and large intestine.  Viral gastroenteritis is also known as the stomach flu. This condition may affect your stomach, small intestine, and large intestine. It can cause sudden watery diarrhea, fever, and vomiting. This condition is caused by many different viruses. These viruses can be passed from person to person very easily (are contagious).    Diarrhea and vomiting can make you feel weak and cause you to become dehydrated. You may not be able to keep fluids down. Dehydration can make you tired and thirsty, cause you to have a dry mouth, and decrease how often you urinate. It is important to replace the fluids that you lose from diarrhea and vomiting.    What are the causes?  Gastroenteritis is caused by many viruses, including rotavirus and norovirus. Norovirus is the most common cause in adults. You can get sick after being exposed to the viruses from other people. You can also get sick by:  Eating food, drinking water, or touching a surface contaminated with one of these viruses.  Sharing utensils or other personal items with an infected person.  What increases the risk?  You are more likely to develop this condition if you:  Have a weak body defense system (immune system).  Live with one or more children who are younger than 2 years.  Live in a nursing home.  Travel on cruise ships.  What are the signs or symptoms?  Symptoms of this condition start suddenly 1–3 days after exposure to a virus. Symptoms may last for a few days or for as long as a week. Common symptoms include watery diarrhea and vomiting. Other symptoms include:  Fever.  Headache.  Fatigue.  Pain in the abdomen.  Chills.  Weakness.  Nausea.  Muscle aches.  Loss of appetite.  How is this diagnosed?  This condition is diagnosed with a medical history and physical exam. You may also have a stool test to check for viruses or other infections.    How is this treated?  This condition typically goes away on its own. The focus of treatment is to prevent dehydration and restore lost fluids (rehydration). This condition may be treated with:  An oral rehydration solution (ORS) to replace important salts and minerals (electrolytes) in your body. Take this if told by your health care provider. This is a drink that is sold at pharmacies and retail stores.  Medicines to help with your symptoms.  Probiotic supplements to reduce symptoms of diarrhea.  Fluids given through an IV, if dehydration is severe.  Older adults and people with other diseases or a weak immune system are at higher risk for dehydration.    Follow these instructions at home:  Eating and drinking    A comparison of three sample cups showing dark yellow, yellow, and pale yellow urine.  Take an ORS as told by your health care provider.  Drink clear fluids in small amounts as you are able. Clear fluids include:  Water.  Ice chips.  Diluted fruit juice.  Low-calorie sports drinks.  Drink enough fluid to keep your urine pale yellow.  Eat small amounts of healthy foods every 3–4 hours as you are able. This may include whole grains, fruits, vegetables, lean meats, and yogurt.  Avoid fluids that contain a lot of sugar or caffeine, such as energy drinks, sports drinks, and soda.  Avoid spicy or fatty foods.  Avoid alcohol.  General instructions    Washing hands with soap and water.  Wash your hands often, especially after having diarrhea or vomiting. If soap and water are not available, use hand .  Make sure that all people in your household wash their hands well and often.  Take over-the-counter and prescription medicines only as told by your health care provider.  Rest at home while you recover.  Watch your condition for any changes.  Take a warm bath to relieve any burning or pain from frequent diarrhea episodes.  Keep all follow-up visits. This is important.  Contact a health care provider if you:  Cannot keep fluids down.  Have symptoms that get worse.  Have new symptoms.  Feel light-headed or dizzy.  Have muscle cramps.  Get help right away if you:  Have chest pain.  Have trouble breathing or you are breathing very quickly.  Have a fast heartbeat.  Feel extremely weak or you faint.  Have a severe headache, a stiff neck, or both.  Have a rash.  Have severe pain, cramping, or bloating in your abdomen.  Have skin that feels cold and clammy.  Feel confused.  Have pain when you urinate.  Have signs of dehydration, such as:  Dark urine, very little urine, or no urine.  Cracked lips.  Dry mouth.  Sunken eyes.  Sleepiness.  Weakness.  Have signs of bleeding, such as:  Seeing blood in your vomit.  Having vomit that looks like coffee grounds.  Having bloody or black stools or stools that look like tar.  These symptoms may be an emergency. Get help right away. Call 911.  Do not wait to see if the symptoms will go away.  Do not drive yourself to the hospital.  Summary  Viral gastroenteritis is also known as the stomach flu. It can cause sudden watery diarrhea, fever, and vomiting.  This condition can be passed from person to person very easily (is contagious).  Take an oral rehydration solution (ORS) if told by your health care provider. This is a drink that is sold at pharmacies and retail stores.  Wash your hands often, especially after having diarrhea or vomiting. If soap and water are not available, use hand .

## 2025-02-25 NOTE — ED PROVIDER NOTE - CLINICAL SUMMARY MEDICAL DECISION MAKING FREE TEXT BOX
HPI this is a female patient with history of POTS presents today for evaluation of nausea vomiting and diarrhea.  Of note patient has had intermittent nausea vomiting and diarrhea symptoms for last 3 weeks or so.  Evaluated in the emergency room and also by gastroenterology as an outpatient.  In the past patient has had 1 episode of suspected bacterial colitis and then had a colonoscopy which  was clear.  Patient also has had some difficulty swallowing in the past and has had a swallow study that was unremarkable and the symptoms have resolved since.  There is significant family history of inflammatory bowel disease and GERD with Dutton's esophagus on her dad side.  Patient has had her stool culture and this recent episode.  Denies any travel or any sick contacts.  Patient was evaluated in the emergency room at [facility] early this morning and had normal blood work and was treated symptomatically and discharged home.  Patient started feeling very nauseous and lightheaded at home so decided to come to the emergency room.  No chest pain or shortness of breath. patient has also had a normal CT Abdo in the last 2 weeks.    On examination patient is alert and oriented and able to vital history.  Dry oral mucosa.  Lung sounds normal, heart sounds normal, abdomen diffusely tender including epigastrium, left upper quadrant, and diffusely in the lower abdomen.  No rebound no guarding.  Depressible abdomen.  Normal neurological examination with no focal deficits.    MDM  Will workup for nausea vomiting diarrhea including blood work, UA, will hydrate and treat supportively.  Since patient has had a CT in the last 2 weeks we will attempt to avoid further radiation and nasion female patient.  Will order stool studies.  Will reassess when results are available.

## 2025-02-25 NOTE — ED PROVIDER NOTE - PROGRESS NOTE DETAILS
Patient feeling better after symptomatic treatment.  Covered with azithromycin for possibility of infectious diarrhea of bacterial origin.  Culture sent to the lab.  Had a discussion with patient and father about supportive care for gastroenteritis.  Patient has Zofran at home.  Will prescribe azithromycin for 2 more days.  Encouraged to take probiotics and to do a bland diet for several days.  Patient will follow-up with gastroenterology.  Encouraged to return to emergency room if any worsening symptoms.

## 2025-02-25 NOTE — ED PROVIDER NOTE - PATIENT PORTAL LINK FT
You can access the FollowMyHealth Patient Portal offered by [facility] by registering at the following website: [email or web address]. By joining [employer]’s FollowMyHealth portal, you will also be able to view your health information using other applications (apps) compatible with our system.

## 2025-02-25 NOTE — ED PROVIDER NOTE - CARE PLAN
Patient return from OR in bed. Family/spouse at bedside. Pt is sleepy/ .4 lap sites to abdomen, ice pack to abdomen. Able to resume clear liquid diet. Pt resting in bed. Will monitor.   Principal Discharge DX:	Gastroenteritis   1

## 2025-02-26 ENCOUNTER — INPATIENT (INPATIENT)
Facility: HOSPITAL | Age: 30
LOS: 0 days | Discharge: HOME | End: 2025-02-27
Attending: HOSPITALIST | Admitting: STUDENT IN AN ORGANIZED HEALTH CARE EDUCATION/TRAINING PROGRAM
Payer: COMMERCIAL

## 2025-02-26 VITALS
RESPIRATION RATE: 20 BRPM | SYSTOLIC BLOOD PRESSURE: 113 MMHG | OXYGEN SATURATION: 99 % | DIASTOLIC BLOOD PRESSURE: 73 MMHG | HEART RATE: 91 BPM | TEMPERATURE: 98 F

## 2025-02-26 VITALS
SYSTOLIC BLOOD PRESSURE: 110 MMHG | HEART RATE: 79 BPM | HEIGHT: 65 IN | RESPIRATION RATE: 18 BRPM | DIASTOLIC BLOOD PRESSURE: 78 MMHG | TEMPERATURE: 98 F | OXYGEN SATURATION: 97 % | WEIGHT: 126.1 LBS

## 2025-02-26 LAB
ALBUMIN SERPL ELPH-MCNC: 3.7 G/DL — SIGNIFICANT CHANGE UP (ref 3.4–5)
ALP SERPL-CCNC: 52 U/L — SIGNIFICANT CHANGE UP (ref 40–120)
ALT FLD-CCNC: 36 U/L — SIGNIFICANT CHANGE UP (ref 12–42)
ANION GAP SERPL CALC-SCNC: 7 MMOL/L — LOW (ref 9–16)
APPEARANCE UR: CLEAR — SIGNIFICANT CHANGE UP
APPEARANCE UR: CLEAR — SIGNIFICANT CHANGE UP
AST SERPL-CCNC: 27 U/L — SIGNIFICANT CHANGE UP (ref 15–37)
BASOPHILS # BLD AUTO: 0.02 K/UL — SIGNIFICANT CHANGE UP (ref 0–0.2)
BASOPHILS NFR BLD AUTO: 0.3 % — SIGNIFICANT CHANGE UP (ref 0–2)
BILIRUB SERPL-MCNC: 0.3 MG/DL — SIGNIFICANT CHANGE UP (ref 0.2–1.2)
BILIRUB UR-MCNC: NEGATIVE — SIGNIFICANT CHANGE UP
BILIRUB UR-MCNC: NEGATIVE — SIGNIFICANT CHANGE UP
BUN SERPL-MCNC: <3 MG/DL — LOW (ref 7–23)
C DIFF GDH STL QL: NEGATIVE — SIGNIFICANT CHANGE UP
C DIFF GDH STL QL: SIGNIFICANT CHANGE UP
CALCIUM SERPL-MCNC: 9.2 MG/DL — SIGNIFICANT CHANGE UP (ref 8.5–10.5)
CHLORIDE SERPL-SCNC: 107 MMOL/L — SIGNIFICANT CHANGE UP (ref 96–108)
CO2 SERPL-SCNC: 26 MMOL/L — SIGNIFICANT CHANGE UP (ref 22–31)
COLOR SPEC: YELLOW — SIGNIFICANT CHANGE UP
COLOR SPEC: YELLOW — SIGNIFICANT CHANGE UP
CREAT SERPL-MCNC: 0.59 MG/DL — SIGNIFICANT CHANGE UP (ref 0.5–1.3)
DIFF PNL FLD: NEGATIVE — SIGNIFICANT CHANGE UP
DIFF PNL FLD: NEGATIVE — SIGNIFICANT CHANGE UP
EGFR: 126 ML/MIN/1.73M2 — SIGNIFICANT CHANGE UP
EOSINOPHIL # BLD AUTO: 0.02 K/UL — SIGNIFICANT CHANGE UP (ref 0–0.5)
EOSINOPHIL NFR BLD AUTO: 0.3 % — SIGNIFICANT CHANGE UP (ref 0–6)
GI PCR PANEL: SIGNIFICANT CHANGE UP
GLUCOSE SERPL-MCNC: 92 MG/DL — SIGNIFICANT CHANGE UP (ref 70–99)
GLUCOSE UR QL: 500 MG/DL
GLUCOSE UR QL: NEGATIVE MG/DL — SIGNIFICANT CHANGE UP
HCT VFR BLD CALC: 35.4 % — SIGNIFICANT CHANGE UP (ref 34.5–45)
HGB BLD-MCNC: 12 G/DL — SIGNIFICANT CHANGE UP (ref 11.5–15.5)
IMM GRANULOCYTES # BLD AUTO: 0.02 K/UL — SIGNIFICANT CHANGE UP (ref 0–0.07)
IMM GRANULOCYTES NFR BLD AUTO: 0.3 % — SIGNIFICANT CHANGE UP (ref 0–0.9)
KETONES UR-MCNC: 15 MG/DL
KETONES UR-MCNC: 40 MG/DL
LACTATE BLDV-MCNC: 1 MMOL/L — SIGNIFICANT CHANGE UP (ref 0.5–2)
LEUKOCYTE ESTERASE UR-ACNC: NEGATIVE — SIGNIFICANT CHANGE UP
LEUKOCYTE ESTERASE UR-ACNC: NEGATIVE — SIGNIFICANT CHANGE UP
LIDOCAIN IGE QN: 27 U/L — SIGNIFICANT CHANGE UP (ref 16–77)
LYMPHOCYTES # BLD AUTO: 2.11 K/UL — SIGNIFICANT CHANGE UP (ref 1–3.3)
LYMPHOCYTES NFR BLD AUTO: 31.9 % — SIGNIFICANT CHANGE UP (ref 13–44)
MCHC RBC-ENTMCNC: 30.2 PG — SIGNIFICANT CHANGE UP (ref 27–34)
MCHC RBC-ENTMCNC: 33.9 G/DL — SIGNIFICANT CHANGE UP (ref 32–36)
MCV RBC AUTO: 88.9 FL — SIGNIFICANT CHANGE UP (ref 80–100)
MONOCYTES # BLD AUTO: 0.43 K/UL — SIGNIFICANT CHANGE UP (ref 0–0.9)
MONOCYTES NFR BLD AUTO: 6.5 % — SIGNIFICANT CHANGE UP (ref 2–14)
NEUTROPHILS # BLD AUTO: 4.01 K/UL — SIGNIFICANT CHANGE UP (ref 1.8–7.4)
NEUTROPHILS NFR BLD AUTO: 60.7 % — SIGNIFICANT CHANGE UP (ref 43–77)
NITRITE UR-MCNC: NEGATIVE — SIGNIFICANT CHANGE UP
NITRITE UR-MCNC: NEGATIVE — SIGNIFICANT CHANGE UP
NRBC # BLD AUTO: 0 K/UL — SIGNIFICANT CHANGE UP (ref 0–0)
NRBC # FLD: 0 K/UL — SIGNIFICANT CHANGE UP (ref 0–0)
NRBC BLD AUTO-RTO: 0 /100 WBCS — SIGNIFICANT CHANGE UP (ref 0–0)
PH UR: 6.5 — SIGNIFICANT CHANGE UP (ref 5–8)
PH UR: 7.5 — SIGNIFICANT CHANGE UP (ref 5–8)
PLATELET # BLD AUTO: 196 K/UL — SIGNIFICANT CHANGE UP (ref 150–400)
PMV BLD: 10.7 FL — SIGNIFICANT CHANGE UP (ref 7–13)
POTASSIUM SERPL-MCNC: 3.8 MMOL/L — SIGNIFICANT CHANGE UP (ref 3.5–5.3)
POTASSIUM SERPL-SCNC: 3.8 MMOL/L — SIGNIFICANT CHANGE UP (ref 3.5–5.3)
PROT SERPL-MCNC: 7.1 G/DL — SIGNIFICANT CHANGE UP (ref 6.4–8.2)
PROT UR-MCNC: NEGATIVE MG/DL — SIGNIFICANT CHANGE UP
PROT UR-MCNC: NEGATIVE MG/DL — SIGNIFICANT CHANGE UP
RBC # BLD: 3.98 M/UL — SIGNIFICANT CHANGE UP (ref 3.8–5.2)
RBC # FLD: 11.8 % — SIGNIFICANT CHANGE UP (ref 10.3–14.5)
SODIUM SERPL-SCNC: 140 MMOL/L — SIGNIFICANT CHANGE UP (ref 132–145)
SP GR SPEC: 1.01 — SIGNIFICANT CHANGE UP (ref 1–1.03)
SP GR SPEC: 1.01 — SIGNIFICANT CHANGE UP (ref 1–1.03)
UROBILINOGEN FLD QL: 0.2 MG/DL — SIGNIFICANT CHANGE UP (ref 0.2–1)
UROBILINOGEN FLD QL: 0.2 MG/DL — SIGNIFICANT CHANGE UP (ref 0.2–1)
WBC # BLD: 6.61 K/UL — SIGNIFICANT CHANGE UP (ref 3.8–10.5)
WBC # FLD AUTO: 6.61 K/UL — SIGNIFICANT CHANGE UP (ref 3.8–10.5)

## 2025-02-26 PROCEDURE — 74177 CT ABD & PELVIS W/CONTRAST: CPT | Mod: 26

## 2025-02-26 PROCEDURE — 99223 1ST HOSP IP/OBS HIGH 75: CPT

## 2025-02-26 RX ORDER — MAGNESIUM, ALUMINUM HYDROXIDE 200-200 MG
30 TABLET,CHEWABLE ORAL ONCE
Refills: 0 | Status: COMPLETED | OUTPATIENT
Start: 2025-02-26 | End: 2025-02-26

## 2025-02-26 RX ORDER — BACTERIOSTATIC SODIUM CHLORIDE 0.9 %
1000 VIAL (ML) INJECTION ONCE
Refills: 0 | Status: COMPLETED | OUTPATIENT
Start: 2025-02-26 | End: 2025-02-26

## 2025-02-26 RX ORDER — ONDANSETRON HCL/PF 4 MG/2 ML
4 VIAL (ML) INJECTION ONCE
Refills: 0 | Status: COMPLETED | OUTPATIENT
Start: 2025-02-26 | End: 2025-02-26

## 2025-02-26 RX ORDER — METOCLOPRAMIDE 10 MG/1
10 TABLET ORAL ONCE
Refills: 0 | Status: COMPLETED | OUTPATIENT
Start: 2025-02-26 | End: 2025-02-26

## 2025-02-26 RX ORDER — AZITHROMYCIN DIHYDRATE 500 MG/1
500 TABLET, FILM COATED ORAL ONCE
Refills: 0 | Status: COMPLETED | OUTPATIENT
Start: 2025-02-26 | End: 2025-02-26

## 2025-02-26 RX ORDER — AZITHROMYCIN DIHYDRATE 500 MG/1
1 TABLET, FILM COATED ORAL
Qty: 2 | Refills: 0
Start: 2025-02-26 | End: 2025-02-27

## 2025-02-26 RX ORDER — LOPERAMIDE HYDROCHLORIDE 2 MG/1
2 CAPSULE ORAL ONCE
Refills: 0 | Status: COMPLETED | OUTPATIENT
Start: 2025-02-26 | End: 2025-02-26

## 2025-02-26 RX ORDER — ONDANSETRON 4 MG/1
1 TABLET, ORALLY DISINTEGRATING ORAL
Qty: 20 | Refills: 0
Start: 2025-02-26 | End: 2025-03-02

## 2025-02-26 RX ORDER — SODIUM CHLORIDE 9 G/1000ML
1000 INJECTION, SOLUTION INTRAVENOUS
Refills: 0 | Status: DISCONTINUED | OUTPATIENT
Start: 2025-02-26 | End: 2025-02-27

## 2025-02-26 RX ADMIN — Medication 1000 MILLILITER(S): at 01:05

## 2025-02-26 RX ADMIN — Medication 4 MILLIGRAM(S): at 13:49

## 2025-02-26 RX ADMIN — Medication 30 MILLILITER(S): at 14:00

## 2025-02-26 RX ADMIN — Medication 1000 MILLILITER(S): at 13:49

## 2025-02-26 RX ADMIN — Medication 30 MILLILITER(S): at 20:53

## 2025-02-26 RX ADMIN — METOCLOPRAMIDE 10 MILLIGRAM(S): 10 TABLET ORAL at 01:00

## 2025-02-26 RX ADMIN — SODIUM CHLORIDE 500 MILLILITER(S): 9 INJECTION, SOLUTION INTRAVENOUS at 16:33

## 2025-02-26 RX ADMIN — LOPERAMIDE HYDROCHLORIDE 2 MILLIGRAM(S): 2 CAPSULE ORAL at 01:00

## 2025-02-26 RX ADMIN — Medication 20 MILLIGRAM(S): at 13:49

## 2025-02-26 RX ADMIN — Medication 4 MILLIGRAM(S): at 20:53

## 2025-02-26 RX ADMIN — AZITHROMYCIN DIHYDRATE 255 MILLIGRAM(S): 500 TABLET, FILM COATED ORAL at 01:01

## 2025-02-26 RX ADMIN — SODIUM CHLORIDE 1000 MILLILITER(S): 9 INJECTION, SOLUTION INTRAVENOUS at 17:30

## 2025-02-26 RX ADMIN — SODIUM CHLORIDE 1000 MILLILITER(S): 9 INJECTION, SOLUTION INTRAVENOUS at 01:13

## 2025-02-26 RX ADMIN — Medication 1000 MILLILITER(S): at 14:50

## 2025-02-26 NOTE — ED CDU PROVIDER INITIAL DAY NOTE - CLINICAL SUMMARY MEDICAL DECISION MAKING FREE TEXT BOX
29-year-old female with no significant past medical history with complaints of 4 to 5 weeks of intermittent nausea vomiting and diarrhea, unknown trigger.  Patient was diagnosed with possible viral gastroenteritis approximately 4 weeks ago, she was unable to tolerate p.o. intermittently and came to the ED yesterday where stool studies were done and labs are unremarkable GI PCR was negative, C. difficile was negative.  She was given 3 L of IV fluid and felt better and was tolerating p.o. prior to discharge.  She returns today with complaints of generalized weakness, persistent nausea and inability to tolerate p.o.  She was seen 2 to 3 weeks ago at [facility] where she had a similar workup, at that time they told her that lab work was unremarkable and CT scan was also negative.  She has no known history of GI symptoms.  She was seen by GI Dr. Deleon for the first time 2 weeks ago, he was considering doing EGD and colonoscopy to identify the cause of her persistent GI symptoms, but intermittently she has episodes where the vomiting recurs and the endoscopy has been deferred.  She returns to the ED today unable to take any p.o., she has not had solid food in greater than 2 to 3 weeks, she states that when she eats pudding/yogurt/milkshakes she has severe nausea, burping, reflux that is not resolved with p.o. Pepcid.  She is not consistently taking any medication for this. She did take Zofran intermittently with partial relief of symptoms.  She has taken Pepto-Bismol with partial relief of symptoms. No fever/chills, no recent travel, no sick contacts.     VSS in NAD non toxic appearing   NCAT EOMI PERRL OP clear  heart RRR no murmur   lungs CTA no wheezing no rales no rhonchi   abd soft NT ND no CVAT no guarding no rebound   normal neuro exam CN I-XII grossly intact, no groos motor or sensory deficits  no peripheral c/c/e    A/P: Persistent nausea vomiting abdominal pain diarrhea, C. difficile negative, GI PCR negative.  Given GI cocktail in the ED, tolerated p.o. Maalox but states that the nausea is persistent.  Given Zofran IV, IV fluids, labs unremarkable.  UA prior with ketones that appear to be clearing.  Plan to give dextrose as patient has not been taking anything p.o.  Admit to observation for reevaluation, serial abdominal exams, if patient continues to be intractable and inability to tolerate p.o. will consider admission.

## 2025-02-26 NOTE — ED ADULT NURSE NOTE - NSFALLUNIVINTERV_ED_ALL_ED
Bed/Stretcher in lowest position, wheels locked, appropriate side rails in place/Call bell, personal items and telephone in reach/Instruct patient to call for assistance before getting out of bed/chair/stretcher/Non-slip footwear applied when patient is off stretcher/[place] to call system/Physically safe environment - no spills, clutter or unnecessary equipment/Purposeful proactive rounding/Room/bathroom lighting operational, light cord in reach

## 2025-02-26 NOTE — ED PROVIDER NOTE - CLINICAL SUMMARY MEDICAL DECISION MAKING FREE TEXT BOX
29-year-old female with no significant past medical history with complaints of 4 to 5 weeks of intermittent nausea vomiting and diarrhea, unknown trigger.  Patient was diagnosed with possible viral gastroenteritis approximately 4 weeks ago, she was unable to tolerate p.o. intermittently and came to the ED yesterday where stool studies were done and labs are unremarkable GI PCR was negative, C. difficile was negative.  She was given 3 L of IV fluid and felt better and was tolerating p.o. prior to discharge.  She returns today with complaints of generalized weakness, persistent nausea and inability to tolerate p.o.  She was seen 2 to 3 weeks ago at [facility] where she had a similar workup, at that time they told her that lab work was unremarkable and CT scan was also negative.  She has no known history of GI symptoms.  She was seen by GI Dr. Deleon for the first time 2 weeks ago, he was considering doing EGD and colonoscopy to identify the cause of her persistent GI symptoms, but intermittently she has episodes where the vomiting recurs and the endoscopy has been deferred.  She returns to the ED today unable to take any p.o., she has not had solid food in greater than 2 to 3 weeks, she states that when she eats pudding/yogurt/milkshakes she has severe nausea, burping, reflux that is not resolved with p.o. Pepcid.  She is not consistently taking any medication for this. She did take Zofran intermittently with partial relief of symptoms.  She has taken Pepto-Bismol with partial relief of symptoms. No fever/chills, no recent travel, no sick contacts.     VSS in NAD non toxic appearing   NCAT EOMI PERRL OP clear  heart RRR no murmur   lungs CTA no wheezing no rales no rhonchi   abd soft NT ND no CVAT no guarding no rebound   normal neuro exam CN I-XII grossly intact, no groos motor or sensory deficits  no peripheral c/c/e    A/P: Persistent nausea vomiting abdominal pain diarrhea, C. difficile negative, GI PCR negative.  Given GI cocktail in the ED, tolerated p.o. Maalox but states that the nausea is persistent.  Given Zofran IV, IV fluids, labs unremarkable.  UA prior with ketones that appear to be clearing.  Plan to give dextrose as patient has not been taking anything p.o.  Admit to observation for reevaluation, serial abdominal exams, if patient continues to be intractable and inability to tolerate p.o. will consider admission.

## 2025-02-26 NOTE — ED CDU PROVIDER DISPOSITION NOTE - CLINICAL COURSE
Patient having persistent nausea/vomiting and diarrhea symptoms. This is her 6th ED visit for this and she has lost 15-20 pounds over the past month. Still having symptoms in the ED. Spoke with her GI who has told her that at this point she requires hospitalization for further workup and management. Patient informed that we admit to [facility] and she is amenable to this.

## 2025-02-26 NOTE — ED ADULT NURSE REASSESSMENT NOTE - NS ED NURSE REASSESS COMMENT FT1
This RN involved for dc only, all instructions and medication explained. Pt with no questions on dc.

## 2025-02-26 NOTE — ED ADULT TRIAGE NOTE - CHIEF COMPLAINT QUOTE
Pt walk in endorsing "mucus diarrhea" and nausea since d/c 8 Hrs ago. Otherwise reports improvement in condition. States she spoke to her gastroenterologist who suggested she return to ER.

## 2025-02-27 ENCOUNTER — TRANSCRIPTION ENCOUNTER (OUTPATIENT)
Age: 30
End: 2025-02-27

## 2025-02-27 VITALS
TEMPERATURE: 99 F | DIASTOLIC BLOOD PRESSURE: 67 MMHG | RESPIRATION RATE: 17 BRPM | OXYGEN SATURATION: 96 % | HEART RATE: 62 BPM | SYSTOLIC BLOOD PRESSURE: 104 MMHG

## 2025-02-27 DIAGNOSIS — Z78.9 OTHER SPECIFIED HEALTH STATUS: ICD-10-CM

## 2025-02-27 DIAGNOSIS — Z98.890 OTHER SPECIFIED POSTPROCEDURAL STATES: Chronic | ICD-10-CM

## 2025-02-27 DIAGNOSIS — R11.2 NAUSEA WITH VOMITING, UNSPECIFIED: ICD-10-CM

## 2025-02-27 DIAGNOSIS — G90.A POSTURAL ORTHOSTATIC TACHYCARDIA SYNDROME [POTS]: ICD-10-CM

## 2025-02-27 DIAGNOSIS — F41.9 ANXIETY DISORDER, UNSPECIFIED: ICD-10-CM

## 2025-02-27 DIAGNOSIS — R19.7 DIARRHEA, UNSPECIFIED: ICD-10-CM

## 2025-02-27 DIAGNOSIS — Z83.79 FAMILY HISTORY OF OTHER DISEASES OF THE DIGESTIVE SYSTEM: ICD-10-CM

## 2025-02-27 DIAGNOSIS — R42 DIZZINESS AND GIDDINESS: ICD-10-CM

## 2025-02-27 DIAGNOSIS — Z91.010 ALLERGY TO PEANUTS: ICD-10-CM

## 2025-02-27 DIAGNOSIS — Z29.9 ENCOUNTER FOR PROPHYLACTIC MEASURES, UNSPECIFIED: ICD-10-CM

## 2025-02-27 DIAGNOSIS — K52.9 NONINFECTIVE GASTROENTERITIS AND COLITIS, UNSPECIFIED: ICD-10-CM

## 2025-02-27 LAB
ALBUMIN SERPL ELPH-MCNC: 4.4 G/DL — SIGNIFICANT CHANGE UP (ref 3.3–5)
ALP SERPL-CCNC: 50 U/L — SIGNIFICANT CHANGE UP (ref 40–120)
ALT FLD-CCNC: 27 U/L — SIGNIFICANT CHANGE UP (ref 10–45)
ANION GAP SERPL CALC-SCNC: 11 MMOL/L — SIGNIFICANT CHANGE UP (ref 5–17)
AST SERPL-CCNC: 19 U/L — SIGNIFICANT CHANGE UP (ref 10–40)
BASOPHILS # BLD AUTO: 0.02 K/UL — SIGNIFICANT CHANGE UP (ref 0–0.2)
BASOPHILS NFR BLD AUTO: 0.3 % — SIGNIFICANT CHANGE UP (ref 0–2)
BILIRUB SERPL-MCNC: 0.3 MG/DL — SIGNIFICANT CHANGE UP (ref 0.2–1.2)
BUN SERPL-MCNC: 3 MG/DL — LOW (ref 7–23)
CALCIUM SERPL-MCNC: 9.2 MG/DL — SIGNIFICANT CHANGE UP (ref 8.4–10.5)
CHLORIDE SERPL-SCNC: 105 MMOL/L — SIGNIFICANT CHANGE UP (ref 96–108)
CO2 SERPL-SCNC: 24 MMOL/L — SIGNIFICANT CHANGE UP (ref 22–31)
CREAT SERPL-MCNC: 0.7 MG/DL — SIGNIFICANT CHANGE UP (ref 0.5–1.3)
CRP SERPL-MCNC: <3 MG/L — SIGNIFICANT CHANGE UP (ref 0–4)
CULTURE RESULTS: SIGNIFICANT CHANGE UP
EGFR: 121 ML/MIN/1.73M2 — SIGNIFICANT CHANGE UP
EOSINOPHIL # BLD AUTO: 0.08 K/UL — SIGNIFICANT CHANGE UP (ref 0–0.5)
EOSINOPHIL NFR BLD AUTO: 1.3 % — SIGNIFICANT CHANGE UP (ref 0–6)
ERYTHROCYTE [SEDIMENTATION RATE] IN BLOOD: 8 MM/HR — SIGNIFICANT CHANGE UP
GLUCOSE SERPL-MCNC: 87 MG/DL — SIGNIFICANT CHANGE UP (ref 70–99)
HCT VFR BLD CALC: 36.9 % — SIGNIFICANT CHANGE UP (ref 34.5–45)
HGB BLD-MCNC: 12.4 G/DL — SIGNIFICANT CHANGE UP (ref 11.5–15.5)
IMM GRANULOCYTES NFR BLD AUTO: 0.3 % — SIGNIFICANT CHANGE UP (ref 0–0.9)
LYMPHOCYTES # BLD AUTO: 2.86 K/UL — SIGNIFICANT CHANGE UP (ref 1–3.3)
LYMPHOCYTES # BLD AUTO: 48 % — HIGH (ref 13–44)
MAGNESIUM SERPL-MCNC: 2 MG/DL — SIGNIFICANT CHANGE UP (ref 1.6–2.6)
MCHC RBC-ENTMCNC: 29.9 PG — SIGNIFICANT CHANGE UP (ref 27–34)
MCHC RBC-ENTMCNC: 33.6 G/DL — SIGNIFICANT CHANGE UP (ref 32–36)
MCV RBC AUTO: 88.9 FL — SIGNIFICANT CHANGE UP (ref 80–100)
MONOCYTES # BLD AUTO: 0.38 K/UL — SIGNIFICANT CHANGE UP (ref 0–0.9)
MONOCYTES NFR BLD AUTO: 6.4 % — SIGNIFICANT CHANGE UP (ref 2–14)
NEUTROPHILS # BLD AUTO: 2.6 K/UL — SIGNIFICANT CHANGE UP (ref 1.8–7.4)
NEUTROPHILS NFR BLD AUTO: 43.7 % — SIGNIFICANT CHANGE UP (ref 43–77)
NRBC BLD AUTO-RTO: 0 /100 WBCS — SIGNIFICANT CHANGE UP (ref 0–0)
PHOSPHATE SERPL-MCNC: 3.1 MG/DL — SIGNIFICANT CHANGE UP (ref 2.5–4.5)
PLATELET # BLD AUTO: 194 K/UL — SIGNIFICANT CHANGE UP (ref 150–400)
POTASSIUM SERPL-MCNC: 3.3 MMOL/L — LOW (ref 3.5–5.3)
POTASSIUM SERPL-SCNC: 3.3 MMOL/L — LOW (ref 3.5–5.3)
PROT SERPL-MCNC: 6.8 G/DL — SIGNIFICANT CHANGE UP (ref 6–8.3)
RBC # BLD: 4.15 M/UL — SIGNIFICANT CHANGE UP (ref 3.8–5.2)
RBC # FLD: 11.9 % — SIGNIFICANT CHANGE UP (ref 10.3–14.5)
SODIUM SERPL-SCNC: 140 MMOL/L — SIGNIFICANT CHANGE UP (ref 135–145)
SPECIMEN SOURCE: SIGNIFICANT CHANGE UP
WBC # BLD: 5.96 K/UL — SIGNIFICANT CHANGE UP (ref 3.8–10.5)
WBC # FLD AUTO: 5.96 K/UL — SIGNIFICANT CHANGE UP (ref 3.8–10.5)

## 2025-02-27 PROCEDURE — 83690 ASSAY OF LIPASE: CPT

## 2025-02-27 PROCEDURE — 96376 TX/PRO/DX INJ SAME DRUG ADON: CPT

## 2025-02-27 PROCEDURE — 36415 COLL VENOUS BLD VENIPUNCTURE: CPT

## 2025-02-27 PROCEDURE — 85025 COMPLETE CBC W/AUTO DIFF WBC: CPT

## 2025-02-27 PROCEDURE — 80053 COMPREHEN METABOLIC PANEL: CPT

## 2025-02-27 PROCEDURE — 74177 CT ABD & PELVIS W/CONTRAST: CPT | Mod: MC

## 2025-02-27 PROCEDURE — G0378: CPT

## 2025-02-27 PROCEDURE — 86140 C-REACTIVE PROTEIN: CPT

## 2025-02-27 PROCEDURE — 96375 TX/PRO/DX INJ NEW DRUG ADDON: CPT

## 2025-02-27 PROCEDURE — 93010 ELECTROCARDIOGRAM REPORT: CPT

## 2025-02-27 PROCEDURE — 83605 ASSAY OF LACTIC ACID: CPT

## 2025-02-27 PROCEDURE — 85652 RBC SED RATE AUTOMATED: CPT

## 2025-02-27 PROCEDURE — 99285 EMERGENCY DEPT VISIT HI MDM: CPT | Mod: 25

## 2025-02-27 PROCEDURE — 84100 ASSAY OF PHOSPHORUS: CPT

## 2025-02-27 PROCEDURE — 99236 HOSP IP/OBS SAME DATE HI 85: CPT

## 2025-02-27 PROCEDURE — 93005 ELECTROCARDIOGRAM TRACING: CPT

## 2025-02-27 PROCEDURE — 96374 THER/PROPH/DIAG INJ IV PUSH: CPT

## 2025-02-27 PROCEDURE — 81003 URINALYSIS AUTO W/O SCOPE: CPT

## 2025-02-27 PROCEDURE — 96361 HYDRATE IV INFUSION ADD-ON: CPT

## 2025-02-27 PROCEDURE — 83735 ASSAY OF MAGNESIUM: CPT

## 2025-02-27 RX ORDER — PROPRANOLOL HCL 60 MG
0.5 TABLET ORAL
Refills: 0 | DISCHARGE

## 2025-02-27 RX ORDER — FLUOXETINE HYDROCHLORIDE 20 MG/1
10 CAPSULE ORAL DAILY
Refills: 0 | Status: DISCONTINUED | OUTPATIENT
Start: 2025-02-27 | End: 2025-02-27

## 2025-02-27 RX ORDER — ONDANSETRON HCL/PF 4 MG/2 ML
1 VIAL (ML) INJECTION
Qty: 12 | Refills: 0
Start: 2025-02-27 | End: 2025-03-01

## 2025-02-27 RX ORDER — SODIUM CHLORIDE 9 G/1000ML
1000 INJECTION, SOLUTION INTRAVENOUS
Refills: 0 | Status: DISCONTINUED | OUTPATIENT
Start: 2025-02-27 | End: 2025-02-27

## 2025-02-27 RX ORDER — ONDANSETRON HCL/PF 4 MG/2 ML
4 VIAL (ML) INJECTION EVERY 6 HOURS
Refills: 0 | Status: DISCONTINUED | OUTPATIENT
Start: 2025-02-27 | End: 2025-02-27

## 2025-02-27 RX ORDER — MAGNESIUM, ALUMINUM HYDROXIDE 200-200 MG
30 TABLET,CHEWABLE ORAL EVERY 4 HOURS
Refills: 0 | Status: DISCONTINUED | OUTPATIENT
Start: 2025-02-27 | End: 2025-02-27

## 2025-02-27 RX ORDER — INFLUENZA A VIRUS A/IDAHO/07/2018 (H1N1) ANTIGEN (MDCK CELL DERIVED, PROPIOLACTONE INACTIVATED, INFLUENZA A VIRUS A/INDIANA/08/2018 (H3N2) ANTIGEN (MDCK CELL DERIVED, PROPIOLACTONE INACTIVATED), INFLUENZA B VIRUS B/SINGAPORE/INFTT-16-0610/2016 ANTIGEN (MDCK CELL DERIVED, PROPIOLACTONE INACTIVATED), INFLUENZA B VIRUS B/IOWA/06/2017 ANTIGEN (MDCK CELL DERIVED, PROPIOLACTONE INACTIVATED) 15; 15; 15; 15 UG/.5ML; UG/.5ML; UG/.5ML; UG/.5ML
0.5 INJECTION, SUSPENSION INTRAMUSCULAR ONCE
Refills: 0 | Status: DISCONTINUED | OUTPATIENT
Start: 2025-02-27 | End: 2025-02-27

## 2025-02-27 RX ORDER — FLUOXETINE HYDROCHLORIDE 20 MG/1
1 CAPSULE ORAL
Refills: 0 | DISCHARGE

## 2025-02-27 RX ORDER — NORGESTIMATE AND ETHINYL ESTRADIOL 7DAYSX3 28
1 KIT ORAL
Refills: 0 | DISCHARGE

## 2025-02-27 RX ORDER — SUCRALFATE 1 G
1 TABLET ORAL
Qty: 90 | Refills: 0
Start: 2025-02-27 | End: 2025-03-28

## 2025-02-27 RX ADMIN — SODIUM CHLORIDE 80 MILLILITER(S): 9 INJECTION, SOLUTION INTRAVENOUS at 05:43

## 2025-02-27 RX ADMIN — Medication 4 MILLIGRAM(S): at 10:31

## 2025-02-27 RX ADMIN — FLUOXETINE HYDROCHLORIDE 10 MILLIGRAM(S): 20 CAPSULE ORAL at 11:14

## 2025-02-27 RX ADMIN — Medication 40 MILLIGRAM(S): at 11:14

## 2025-02-27 RX ADMIN — Medication 40 MILLIEQUIVALENT(S): at 08:15

## 2025-02-27 RX ADMIN — Medication 4 MILLIGRAM(S): at 05:43

## 2025-02-27 NOTE — H&P ADULT - PROBLEM SELECTOR PLAN 2
Home med: Propranolol 5mg QAM, 5mg QHS PRN.    - c/w home med Home med: Propranolol 5mg QAM (and 5mg QHS PRN).    - c/w home med Unclear etiology at this time.  GI PCR negative, with C. dificile negative.  No recent consumption of unpasteurized dairy products, raw or undercooked meat or fish.  CT A/P  Differential dx includes: IBS vs. IBD (though patient without bloody diarrhea).  Notably, prior colonoscopy in 2/2023 negative though patient with family history significant for IBD in father's side of the family (Crohn's disease and proctitis).  s/p 1L NS and 1L D5 in ED    - Clear liquid diet, advance as tolerated  - Replete electrolytes as necessary; monitor for Refeeding syndrome  - Consider GI consult in AM Unclear etiology at this time.  GI PCR negative, with C. dificile negative.  No recent consumption of unpasteurized dairy products, raw or undercooked meat or fish.  CT A/P  Differential dx includes: IBS vs. IBD (though patient without bloody diarrhea).  Notably, prior colonoscopy in 2/2023 negative though patient with family history significant for IBD in father's side of the family (Crohn's disease and proctitis).  s/p 1L NS and 1L D5 in ED    -outpatient follow up, IBD less likely lack of anemia, inflammatory markers  - Full liquid diet  - Replete electrolytes as necessary; monitor for Refeeding syndrome

## 2025-02-27 NOTE — H&P ADULT - PROBLEM SELECTOR PLAN 1
Unclear etiology at this time.  GI PCR negative, with C. dificile negative.  No recent consumption of unpasteurized dairy products, raw or undercooked meat or fish.  CT A/P  Differential dx includes: IBS vs. IBD (though patient without bloody diarrhea).  Notably, prior colonoscopy in 2/2023 negative though patient with family history significant for IBD in father's side of the family (Crohn's disease and proctitis).  s/p 1L NS and 1L D5 in ED    - Clear liquid diet, advance as tolerated  - Replete electrolytes as necessary; monitor for Refeeding syndrome  - Consider GI consult in AM GERD/Erosive Esophagitis (given EGD findings, heartburn, with nausea/vomiting) vs. hypercalcemia.  Pt reports 15 episodes of Non-bilious vomiting, with intermittent dark red/coffee-ground emesis x4 days. Endorses nausea/vomiting since starting Ozempic in mid-August that generally self-resolve; this episode began after using Ozempic Wednesday (recently increased dosage and received 2 doses of 2mg injection, with most recent dose last Wednesday 1/17).   Notably, pt has had 2 prior visits with similar presenting symptoms (ED visit in 9/2023 and prior admission 9/2022), found to have Grade D esophagitis in lower third of esophagus c/w non-specific erosive esophagitis on EGD in 9/2022. Previously treated with PPI BID x2 weeks then daily x6 weeks and advised to stop alcohol use, avoid smoking, and NSAIDs.   Low suspicion for drug-use as etiology including opioids and cannabis, both of which pt denies use.  Low suspicion for organic etiology such as cholelithiasis, given negative Sofia's sign and RUQ US unrevealing for gallstones.  Low suspicion for bowel obstruction or Olgivie's syndrome, given no abdominal distension, tenderness, or distended bowel loops on imaging.  Low suspicion for gastroparesis given no temporal association with eating food; last meal was Tuesday night.  No feculent vomiting that may suggest gastrocolic fistula as etiology.   No dental enamel erosion, parotid gland enlargement, lanugo-like hair, or calluses on dorsal surface of hands that may suggest Bulimia picture.   Pt endorses temporal headache since persistent vomiting/dehydration but otherwises denies any migraines/headaches that may indicate migraine-associated vomiting.  No recent travel/sick contacts or diarrhea that may suggest viral/bacterial gastroenteritis picture.  - fecal calprotectin  - ESR, CRP  - Zofran 4mg Q6 PRN  - Consider GI consult in AM Differential Dx includes GERD vs. Gastroparesis.  Pt reports persistent nausea x1 month with cyclical episodes of diarrhea with poor PO intake and regurgitation of foods consumed.  Denies alcohol use, tobacco use, and NSAIDs use.   Low suspicion for drug-use as etiology including opioids and cannabis, both of which pt denies use.  Low suspicion for organic etiology such as cholelithiasis, given negative Sofia's sign and CT A/P unrevealing for gallstones.  Low suspicion for bowel obstruction or Olgivie's syndrome, given no abdominal distension, tenderness, or distended bowel loops on imaging.  No feculent vomiting that may suggest gastrocolic fistula as etiology.   No dental enamel erosion, parotid gland enlargement, lanugo-like hair, or calluses on dorsal surface of hands that may suggest Bulimia picture.   Pt denies any migraines/headaches that may indicate migraine-associated vomiting.  No recent travel/sick contacts, and GI PCR negative for viral/bacterial gastroenteritis picture.  s/p 1L NS and 1L D5 in ED    - Clear liquid diet, advance as tolerated  - fecal calprotectin  - ESR, CRP  - Zofran 4mg Q6 PRN (QTc 437, 2/27)  - Consider GI consult in AM if unable to tolerate any PO foods Differential Dx includes GERD vs. Gastroparesis.  Pt reports persistent nausea x1 month with cyclical episodes of diarrhea with poor PO intake and regurgitation of foods consumed.  Denies alcohol use, tobacco use, and NSAIDs use.   Low suspicion for drug-use as etiology including opioids and cannabis, both of which pt denies use.  Low suspicion for organic etiology such as cholelithiasis, given negative Sofia's sign and CT A/P unrevealing for gallstones.  Low suspicion for bowel obstruction or Olgivie's syndrome, given no abdominal distension, tenderness, or distended bowel loops on imaging.  No feculent vomiting that may suggest gastrocolic fistula as etiology.   No dental enamel erosion, parotid gland enlargement, lanugo-like hair, or calluses on dorsal surface of hands that may suggest Bulimia picture.   Pt denies any migraines/headaches that may indicate migraine-associated vomiting.  No recent travel/sick contacts, and GI PCR negative for viral/bacterial gastroenteritis picture.  s/p 1L NS and 1L D5 in ED    - IV Protonix 40mg QD  - Clear liquid diet, advance as tolerated  - fecal calprotectin  - ESR, CRP  - Zofran 4mg Q6 PRN (QTc 437, 2/27)  - Consider GI consult in AM if unable to tolerate any PO foods Differential Dx includes GERD vs. Gastroparesis.  Pt reports persistent nausea x1 month with cyclical episodes of diarrhea with poor PO intake and regurgitation of foods consumed.  Denies alcohol use, tobacco use, and NSAIDs use.   Low suspicion for drug-use as etiology including opioids and cannabis, both of which pt denies use.  Low suspicion for organic etiology such as cholelithiasis, given negative Sofia's sign and CT A/P unrevealing for gallstones.  Low suspicion for bowel obstruction or Olgivie's syndrome, given no abdominal distension, tenderness, or distended bowel loops on imaging.  No feculent vomiting that may suggest gastrocolic fistula as etiology.   No dental enamel erosion, parotid gland enlargement, lanugo-like hair, or calluses on dorsal surface of hands that may suggest Bulimia picture.   Pt denies any migraines/headaches that may indicate migraine-associated vomiting.  No recent travel/sick contacts, and GI PCR negative for viral/bacterial gastroenteritis picture.  s/p 1L NS and 1L D5 in ED    - D5 80cc/hr x12 hours  - IV Protonix 40mg QD  - Clear liquid diet, advance as tolerated  - fecal calprotectin  - ESR, CRP  - Zofran 4mg Q6 PRN (QTc 437, 2/27)  - Consider GI consult in AM if unable to tolerate any PO foods Differential Dx includes GERD vs. Gastroparesis.  Pt reports persistent nausea x1 month with cyclical episodes of diarrhea with poor PO intake and regurgitation of foods consumed. She had significant reflux in the past that improved with PPI, but no longer taking  Denies alcohol use, tobacco use, and NSAIDs use.   Low suspicion for drug-use as etiology including opioids and cannabis, both of which pt denies use.  No dental enamel erosion, parotid gland enlargement, lanugo-like hair, or calluses on dorsal surface of hands that may suggest Bulimia picture.   Pt denies any migraines/headaches that may indicate migraine-associated vomiting.  No recent travel/sick contacts, and GI PCR negative for viral/bacterial gastroenteritis picture.  s/p 1L NS and 1L D5 in ED  -ESR, CRP neg    - D5 80cc/hr x12 hours  - IV Protonix 40mg QD-transition to PO upon discharge  - Clear liquid diet, advance as tolerated  - fecal calprotectin outpatient  - Zofran 4mg Q6 PRN (QTc 437, 2/27)  -GI outpatient

## 2025-02-27 NOTE — H&P ADULT - ASSESSMENT
Ms. Piper is a 28YO F with PMHx POTS 2/2 Long-COVID and Anxiety who presents from [facility] due to persistent diarrhea x1 month, found to have GI PCR and C.dificile testing negative with unremarkable CT A/P. Now admitted to medicine for further work-up and management of diarrhea and poor PO intake with unknown primary etiology. Ms. Piper is a 28YO F with PMHx POTS 2/2 Long-COVID and Anxiety who presents from [facility] due to persistent diarrhea x1 month and poor PO intake, found to have GI PCR and C.dificile testing negative with unremarkable CT A/P. Now admitted to medicine for further work-up and management of chronic diarrhea and poor PO intake with unknown primary etiology.

## 2025-02-27 NOTE — H&P ADULT - PROBLEM SELECTOR PLAN 6
F: s/p 1L NS and 1L D5  E: Replete as necessary, with goal K>4 and Mg>2  N: Clear liquids, advance as tolerated  DVT: SCDs  GI: Protonix  DISPO: RMF F: s/p 1L NS and 1L D5  E: Replete as necessary, with goal K>4 and Mg>2  N: Full liquids, advance as tolerated  DVT: SCDs  GI: Protonix  DISPO: RMF

## 2025-02-27 NOTE — DISCHARGE NOTE PROVIDER - ATTENDING DISCHARGE PHYSICAL EXAMINATION:
******************************************************  ATTENDING ATTESTATION    I have read and agree with the resident Discharge Note above. Patient seen and discussed with resident team on the day of discharge.     Briefly, 29F with a history of POTS 2/2 long-COVID, anxiety, prior history of norovirus and reported colitis seen on EGD/colo in 2023 and chronic nonspecific GI symptoms for which she follows with [facility] Dr. Deleon, who presented for acute on chronic diarrhea and reflux Sx with difficulty tolerating PO. Has had cycles of nonbloody loose stools x 4 weeks, also with "nausea in the throat" when she eats and particularly with large volumes of food, sometimes with regurgitate food as well with Sx of reglux. No dysphagia specifically, and no hematemesis or e/o melena or hematochezia. Has diffuse abd pain sometimes when she is having episodes of nausea. Labs are unremarkable, she has normal renal and hepatic function, no leukocytosis and no eosinophilia, only has mild hypoK that was repleted orally. UA is notable for ketones likely in the setting of poor intake, otherwise unremarkable. CTAP was also unremarkable. GI PCR, c diff were negative. Patient was started on IV PPI and IV zofran with improvement in her symptoms and she was able to ultimately tolerate PO liquids and small amounts of solids. Suspect that her symptoms are due to gastritis vs esophagitis, vs IBS, also likely a functional component a well. Given very reassuring work up and lack of evidence for an acute pathologic process, she can follow up outpatient for a non-emergent endoscopy and colonoscopy. I discussed results of work up and plan for discharge with patient's outpatient gastroenterologist Dr. Deleon, who is in agreement to see patient in office for close follow up. She will be discharged with PO PPI and zofran and sucralfate PRN.    Physical Exam:  T(C): 37.1  HR: 62  BP: 104/67  RR: 17  SpO2: 96%    Gen: sitting upright in bed at time of exam  HEENT: NCAT, MMM, clear OP  Pulm: adequate respiratory effort, no increased work of breathing  Abd: soft, non-distended  Skin: warm and dry, no new rashes vs prior report  Ext: WWP  Neuro: AOx3, no gross focal neurological deficits  Psych: affect and behavior appropriate    I was physically present for the evaluation and management services provided. I agree with the included history, physical, and plan which I reviewed and edited where appropriate. I spent 33 minutes on direct patient care and discharge planning with more than 50% of the visit spent on counseling and/or coordination of care.

## 2025-02-27 NOTE — H&P ADULT - PROBLEM SELECTOR PLAN 5
F: s/p 1L NS and 1L D5  E: Replete as necessary, with goal K>4 and Mg>2  N: Clear liquids, advance as tolerated  DVT: SCDs  GI: Protonix  DISPO: RMF Home med: Sprintec QD    - c/w home med (patient's sister will be bringing home med)

## 2025-02-27 NOTE — DISCHARGE NOTE PROVIDER - NSDCCPCAREPLAN_GEN_ALL_CORE_FT
PRINCIPAL DISCHARGE DIAGNOSIS  Diagnosis: GERD (gastroesophageal reflux disease)  Assessment and Plan of Treatment: GERD is a condition in which stomach acid repeatedly flows back up into the esophagus, causing heartburn and other symptoms. This reflux can cause trouble swallowing and nausea.  You should:  -take protonix 40mg daily before breakfast  -take sucralafate 1g three times a day  -take zofran 4mg every 6 hours as needed for nausea  -see your gastroenterologist for endoscopy      SECONDARY DISCHARGE DIAGNOSES  Diagnosis: Chronic diarrhea  Assessment and Plan of Treatment: Chronic diarrhea is characterized by diarrhea that lasts more than four weeks or comes and goes regularly over a long period. Your inflammatory markers such as CRP and ESR were negative. Your c difficile and GI pcr panel were both negative also. You had CT abdomen that did not show signs of colitis.  You should:  -advance your diet as tolerated and stay hydrated  -follow up with your gastroenterologist for colonoscopy

## 2025-02-27 NOTE — PATIENT PROFILE ADULT - FALL HARM RISK - UNIVERSAL INTERVENTIONS
Bed in lowest position, wheels locked, appropriate side rails in place/Call bell, personal items and telephone in reach/Instruct patient to call for assistance before getting out of bed or chair/Non-slip footwear when patient is out of bed/[place] to call system/Physically safe environment - no spills, clutter or unnecessary equipment/Purposeful Proactive Rounding/Room/bathroom lighting operational, light cord in reach

## 2025-02-27 NOTE — DISCHARGE NOTE NURSING/CASE MANAGEMENT/SOCIAL WORK - NSDCPEFALRISK_GEN_ALL_CORE
For information on Fall & Injury Prevention, visit: [email or web address] OR  [email or web address] OR  https://www.cdc.gov/steadi/patient.html

## 2025-02-27 NOTE — H&P ADULT - PROBLEM SELECTOR PLAN 3
Home med: Prozac 10mg QD    - c/w home med Home med: Propranolol 5mg QAM (and 5mg QHS PRN).    - c/w home med

## 2025-02-27 NOTE — DISCHARGE NOTE PROVIDER - HOSPITAL COURSE
#Discharge: do not delete    Ms. Piper is a 28YO F with PMHx POTS 2/2 Long-COVID and Anxiety who presents from [facility] due to persistent diarrhea x1 month and poor PO intake, found to have GI PCR and C.dificile testing negative with unremarkable CT A/P. Now admitted to medicine for further work-up and management of chronic diarrhea and poor PO intake with unknown primary etiology.      Hospital course (by problem):   #nausea  #reflux  Patient notes in the past she had significant GERD and gastritis seen on EGD. She notes her symptoms 100% resolved with PPI. She has not taken PPI any time recently and notes a month of feeling large meals or pills being stuck in her chest. She reports that sensation is what causes the nausea. She also notes constant burping and reflux with trials of pepcid, pepto-bismol without relief. Inpatient she received IV protonix and maalox with minimal relief. She was trialed on full liquid diet.   -c/w zofran 4mg q6 prn nausea  -c/w protonix 40mg daily  -GI follow up; may benefit from outpatient EGD and h pylori breath test    #diarrhea  Unclear etiology as patient had norovirus a month ago that triggered these sx. She does not bloody stools in summer and calprotectin neg and she reports colonoscopy showed "colitis" but no ulcers. She has been alternating clear liquid diets at home given mucus in stools and loose stools but often feels weak. GI PCR and c diff negative inpatient.  Inflammatory markers such as ESR, CRP neg and no anemia seen. Likely not IBD. She takes prozac for IBS which may not be best iso IBS.  -GI follow up outpatient; consider repeat calprotectin and colonoscopy if warranted    Patient was discharged to: home    New medications: protonix 40mg daily, zofran 4mg q6 prn  Changes to old medications: none  Medications that were stopped:none     Items to follow up as outpatient:  -gastroenterology  Physical exam at the time of discharge:  CONSTITUTIONAL: Well groomed, no apparent distress. Appears pale  EYES: No conjunctival or scleral injection, non-icteric  ENMT: Dry MM.  NECK: Supple, symmetric  RESP: No respiratory distress, no use of accessory muscles; CTA b/l, no WRR  CV: RRR, +S1S2, no MRG  GI: Soft, ND, +Non-specific diffuse TTP. +Bowel sounds present  MSK: No clubbing, cyanosis, or edema. No TTP of B/L elbow or wrist joints.  SKIN: No rashes or ulcers noted  PSYCH: Appropriate insight/judgment; A+O x 3, mood and affect appropriate, recent/remote memory intact       #Discharge: do not delete    Ms. Piper is a 30YO F with PMHx POTS 2/2 Long-COVID and Anxiety who presents from [facility] due to persistent diarrhea x1 month and poor PO intake, found to have GI PCR and C.dificile testing negative with unremarkable CT A/P. Now admitted to medicine for further work-up and management of chronic diarrhea and poor PO intake with unknown primary etiology.      Hospital course (by problem):   #nausea  #reflux  Patient notes in the past she had significant GERD and gastritis seen on EGD. She notes her symptoms 100% resolved with PPI. She has not taken PPI any time recently and notes a month of feeling large meals or pills being stuck in her chest. She reports that sensation is what causes the nausea. She also notes constant burping and reflux with trials of pepcid, pepto-bismol without relief. Inpatient she received IV protonix and maalox with minimal relief. She was trialed on full liquid diet.   -c/w zofran 4mg q6 prn nausea  -c/w protonix 40mg daily  -sucralafate 1g three times a day  -GI follow up; may benefit from outpatient EGD and h pylori breath test    #diarrhea  Unclear etiology as patient had norovirus a month ago that triggered these sx. She does not bloody stools in summer and calprotectin neg and she reports colonoscopy showed "colitis" but no ulcers. She has been alternating clear liquid diets at home given mucus in stools and loose stools but often feels weak. GI PCR and c diff negative inpatient.  Inflammatory markers such as ESR, CRP neg and no anemia seen. Likely not IBD. She takes prozac for IBS which may not be best iso IBS.  -GI follow up outpatient; consider repeat calprotectin and colonoscopy if warranted    Patient was discharged to: home    New medications: protonix 40mg daily, zofran 4mg q6 prn  Changes to old medications: none  Medications that were stopped:none     Items to follow up as outpatient:  -gastroenterology  Physical exam at the time of discharge:  CONSTITUTIONAL: Well groomed, no apparent distress. Appears pale  EYES: No conjunctival or scleral injection, non-icteric  ENMT: Dry MM.  NECK: Supple, symmetric  RESP: No respiratory distress, no use of accessory muscles; CTA b/l, no WRR  CV: RRR, +S1S2, no MRG  GI: Soft, ND, +Non-specific diffuse TTP. +Bowel sounds present  MSK: No clubbing, cyanosis, or edema. No TTP of B/L elbow or wrist joints.  SKIN: No rashes or ulcers noted  PSYCH: Appropriate insight/judgment; A+O x 3, mood and affect appropriate, recent/remote memory intact       #Discharge: do not delete    Ms. Piper is a 30YO F with PMHx POTS 2/2 Long-COVID and Anxiety who presents from [facility] due to persistent diarrhea x1 month and poor PO intake, found to have GI PCR and C.dificile testing negative with unremarkable CT A/P. Now admitted to medicine for further work-up and management of chronic diarrhea and poor PO intake with unknown primary etiology.      Hospital course (by problem):   #nausea  #reflux  Patient notes in the past she had significant GERD and gastritis seen on EGD. She notes her symptoms 100% resolved with PPI. She has not taken PPI any time recently and notes a month of feeling large meals or pills being stuck in her chest. She reports that sensation is what causes the nausea. She also notes constant burping and reflux with trials of pepcid, pepto-bismol without relief. Inpatient she received IV protonix and maalox with minimal relief. She was trialed on full liquid diet and tolerated well.  -c/w zofran 4mg q6 prn nausea  -c/w protonix 40mg daily  -sucralafate 1g three times a day  -GI follow up; may benefit from outpatient EGD and h pylori breath test    #diarrhea  Unclear etiology as patient had norovirus a month ago that triggered these sx. She does not bloody stools in summer and calprotectin neg and she reports colonoscopy showed "colitis" but no ulcers. She has been alternating clear liquid diets at home given mucus in stools and loose stools but often feels weak. GI PCR and c diff negative inpatient.  Inflammatory markers such as ESR, CRP neg and no anemia seen. Likely not IBD. She takes prozac for IBS which may not be best iso IBS.  -GI follow up outpatient; consider repeat calprotectin and colonoscopy if warranted    Patient was discharged to: home    New medications: protonix 40mg daily, zofran 4mg q6 prn  Changes to old medications: none  Medications that were stopped:none     Items to follow up as outpatient:  -gastroenterology  Physical exam at the time of discharge:  CONSTITUTIONAL: Well groomed, no apparent distress. Appears pale  EYES: No conjunctival or scleral injection, non-icteric  ENMT: Dry MM.  NECK: Supple, symmetric  RESP: No respiratory distress, no use of accessory muscles; CTA b/l, no WRR  CV: RRR, +S1S2, no MRG  GI: Soft, ND, +Non-specific diffuse TTP. +Bowel sounds present  MSK: No clubbing, cyanosis, or edema. No TTP of B/L elbow or wrist joints.  SKIN: No rashes or ulcers noted  PSYCH: Appropriate insight/judgment; A+O x 3, mood and affect appropriate, recent/remote memory intact

## 2025-02-27 NOTE — H&P ADULT - NSHPPHYSICALEXAM_GEN_ALL_CORE
CONSTITUTIONAL: Well groomed, no apparent distress. Appears pale  EYES: No conjunctival or scleral injection, non-icteric  ENMT: Dry MM.  NECK: Supple, symmetric  RESP: No respiratory distress, no use of accessory muscles; CTA b/l, no WRR  CV: RRR, +S1S2, no MRG  GI: Soft, ND, +Non-specific diffuse TTP. +Bowel sounds present  MSK: No clubbing, cyanosis, or edema. No TTP of B/L elbow or wrist joints.  SKIN: No rashes or ulcers noted  PSYCH: Appropriate insight/judgment; A+O x 3, mood and affect appropriate, recent/remote memory intact

## 2025-02-27 NOTE — H&P ADULT - ATTENDING COMMENTS
29F with a history of POTS 2/2 long-COVID, anxiety, prior history of norovirus and reported colitis seen on EGD/colo in 2023 and chronic nonspecific GI symptoms for which she [place] [facility] [place]on, who presented for acute on chronic diarrhea and reflux Sx with difficulty tolerating PO. Has had cycles of nonbloody loose stools x 4 weeks, also with "nausea in the throat" when she eats and particularly with large volumes of food, sometimes with regurgitate food as well with Sx of reglux. No dysphagia specfically, and no hematemesis or e/o melena or hematochezia. Has diffuse abd pain sometimes when she is having episodes of nausea. Labs are unremarkable, she has normal renal and hepatic function, no leukocytosis and no eosinophilia, only has mild hypoK that was repleted orally. UA is notable for ketones likely in the setting of poor intake, otherwise unremarkable. CTAP was also unremarkable. GI PCR, c diff were negative. Patient was started on IV PPI and IV zofran with improvement in her symptoms and she was able to ultimately tolerate PO liquids and small amounts of solids. Suspect that her symptoms are due to gastritis vs esophagitis, vs IBS, also likely a functional component a well. Given very reassuring work up and lack of evidence for an acute pathologic process, she can follow up outpatient for a non-emergent endoscopy and colonoscopy. I discussed results of work up and plan for discharge with patient's outpatient gastroenterologist Dr. Deleon, who is in agreement to see patient in office for close follow up. She will be discharged with PO PPI and zofran and sucralfate PRN.

## 2025-02-27 NOTE — H&P ADULT - NSHPLABSRESULTS_GEN_ALL_CORE
12.0   6.61  )-----------( 196      ( 26 Feb 2025 12:44 )             35.4       02-26    140  |  107  |  <3[L]  ----------------------------<  92  3.8   |  26  |  0.59    Ca    9.2      26 Feb 2025 12:44  Mg     1.9     02-25    TPro  7.1  /  Alb  3.7  /  TBili  0.3  /  DBili  x   /  AST  27  /  ALT  36  /  AlkPhos  52  02-26

## 2025-02-27 NOTE — DISCHARGE NOTE NURSING/CASE MANAGEMENT/SOCIAL WORK - PATIENT PORTAL LINK FT
You can access the FollowMyHealth Patient Portal offered by [facility] by registering at the following website: [email or web address]. By joining [facility]’s FollowMyHealth portal, you will also be able to view your health information using other applications (apps) compatible with our system.

## 2025-02-27 NOTE — DISCHARGE NOTE PROVIDER - NSDCCPTREATMENT_GEN_ALL_CORE_FT
PRINCIPAL PROCEDURE  Procedure: CT abdomen pelvis  Findings and Treatment: LIVER: Within normal limits.  BILE DUCTS: Normal caliber.  GALLBLADDER: Within normal limits.  SPLEEN: Within normal limits.  PANCREAS: Within normal limits.  ADRENALS: Within normal limits.  KIDNEYS/URETERS: Symmetric enhancement. No stones or hydronephrosis.   Stable left upper pole well-circumscribed hypodensities measuring up to   1.4 cm since 2023.  BLADDER: Within normal limits.  REPRODUCTIVE ORGANS: Uterus and adnexa within normal limits.  BOWEL: No bowel obstruction. Appendix is normal.  PERITONEUM/RETROPERITONEUM: Within normal limits.  VESSELS: Within normal limits.  LYMPH NODES: No lymphadenopathy.  ABDOMINAL WALL: Within normal limits.  BONES: Within normal limits.

## 2025-02-27 NOTE — DISCHARGE NOTE PROVIDER - NSDCMRMEDTOKEN_GEN_ALL_CORE_FT
ondansetron 4 mg oral tablet: 1 tab(s) orally every 6 hours  pantoprazole 40 mg oral delayed release tablet: 1 tab(s) orally once a day in the morning before breakfast  propranolol 10 mg oral tablet: 0.5 tab(s) orally once a day  PROzac 10 mg oral capsule: 1 cap(s) orally once a day  Sprintec 0.25 mg-35 mcg oral tablet: 1 tab(s) orally once a day   ondansetron 4 mg oral tablet: 1 tab(s) orally every 6 hours  pantoprazole 40 mg oral delayed release tablet: 1 tab(s) orally once a day in the morning before breakfast  propranolol 10 mg oral tablet: 0.5 tab(s) orally once a day  PROzac 10 mg oral capsule: 1 cap(s) orally once a day  Sprintec 0.25 mg-35 mcg oral tablet: 1 tab(s) orally once a day  sucralfate 1 g oral tablet: 1 tab(s) orally 3 times a day

## 2025-02-27 NOTE — H&P ADULT - PROBLEM SELECTOR PLAN 4
Home med: Sprintec QD    - c/w home med (patient's sister will be bringing home med) Home med: Prozac 10mg QD    - c/w home med

## 2025-02-27 NOTE — DISCHARGE NOTE NURSING/CASE MANAGEMENT/SOCIAL WORK - FINANCIAL ASSISTANCE
[facility] provides services at a reduced cost to those who are determined to be eligible through [facility]’s financial assistance program. Information regarding [facility]’s financial assistance program can be found by going to [email or web address] or by calling [phone].

## 2025-02-27 NOTE — H&P ADULT - HISTORY OF PRESENT ILLNESS
Ms. Piper is a 28YO F with PMHx POTS 2/2 Long-COVID and Anxiety who presents from [facility] due to persistent diarrhea x1 month. Patient states that since the end of January, she has had constant episodes of non-bloody diarrhea and persistent nausea. She has tried to take Peptobismol and adjust her diet to drink tea, Gatorade, and eat toast, bagel, applesauce, bone and chicken broth, and mashed bananas but nothing has ameliorated her symptoms. She notes that adjusting her diet to eat minimal amounts of the above foods may temporarily decrease the diarrheal episodes for a few days but it returns in a cyclical pattern. She also will try to eat foods and will have reflux-like symptoms where she immediately regurgitates what she took in. Most recently, her symptoms have become severe since Monday. Since then, she has felt very dehydrated and fatigued and went to [facility] ED on Tuesday due to feeling that she may pass out, at which point she was given fluids and sent home. After returning home, she continued to feel nauseous and had bowel movements Q15 minutes noting that even when there was nothing else coming out, her bowel movements would consist of white mucus. This led to her going to [facility] ED on 2/25, where stool studies were done and labs are unremarkable (GI PCR was negative, C. difficile was negative).  She was given 3 L of IV fluid, was tolerating p.o., and thus sent home. After returning home, she continued to have inability to tolerate PO with persistent nausea and diarrhea and contacted her GI Dr. Deleon ([place]) who advised her to return to ED. No recent travel or known sick contacts. Denies any new foods or herbal supplements. Denies any fevers, chills, vomiting, CP, numbness, tingling, or skin rashes though endorses band-like abdominal pain during defecation, abdominal bloating, sporadic joint pains to bilateral elbows and knees, and 15 pound unintentional weight loss.    Notably, in 2/23 patient had Norovirus with suspected Bacterial enterocolitis at which point she had a colonoscopy that was unremarkable. This past summer, she had episodes of bloody diarrhea with mucus and recently began following with GI Dr. Deleon. She has a family history significant for IBD on her father's side (Crohn's disease) and Proctitis in her sister.    ED COURSE  ·	Vitals: T 98F, /78, HR 79, RR 18, SpO2 97% on RA  ·	Labs significant for: AG 7, BUN <3, UA +Ketones, C.dif and GI PCR negative.   ·	Imaging: CT A/P negative  ·	Interventions: Maalox 30mL x1, Zofran 4mg x1. Ms. Piper is a 28YO F with PMHx POTS 2/2 Long-COVID and Anxiety who presents from [facility] due to persistent diarrhea x1 month. Patient states that since the end of January, she has had constant episodes of non-bloody diarrhea and persistent nausea. She has tried to take Peptobismol and adjust her diet to drink tea, Gatorade, and eat toast, bagel, applesauce, bone and chicken broth, and mashed bananas but nothing has ameliorated her symptoms. She notes that adjusting her diet to eat minimal amounts of the above foods may temporarily decrease the diarrheal episodes for a few days but it returns in a cyclical pattern. She also will try to eat foods and will have reflux-like symptoms where she immediately regurgitates what she took in. Most recently, her symptoms have become severe since Monday. Since then, she has felt very dehydrated and fatigued and went to [facility] ED on Tuesday due to feeling that she may pass out, at which point she was given fluids and sent home. After returning home, she continued to feel nauseous and had bowel movements Q15 minutes noting that even when there was nothing else coming out, her bowel movements would consist of white mucus. This led to her going to [facility] ED on 2/25, where stool studies were done and labs are unremarkable (GI PCR was negative, C. difficile was negative).  She was given 3 L of IV fluid, was tolerating p.o., and thus sent home. After returning home, she continued to have inability to tolerate PO with persistent nausea and diarrhea and contacted her GI Dr. Deleon ([place]) who advised her to return to ED. No recent travel or known sick contacts. Denies any new foods or herbal supplements, with no consumption of unpasteurized dairy products, raw or undercooked meat or fish. Denies any fevers, chills, vomiting, CP, numbness, tingling, or skin rashes though endorses band-like abdominal pain during defecation, abdominal bloating, sporadic joint pains to bilateral elbows and knees, and 15 pound unintentional weight loss.    Notably, in 2/23 patient had Norovirus with suspected Bacterial enterocolitis at which point she had a colonoscopy that was unremarkable. This past summer, she had episodes of bloody diarrhea with mucus and recently began following with GI Dr. Deleon. She has a family history significant for IBD on her father's side (Crohn's disease) and Proctitis in her sister.    ED COURSE  ·	Vitals: T 98F, /78, HR 79, RR 18, SpO2 97% on RA  ·	Labs significant for: AG 7, BUN <3, UA +Ketones, C.dif and GI PCR negative.   ·	Imaging: CT A/P negative  ·	Interventions: Maalox 30mL x2, Zofran 4mg x2, Pepcid 20mg x1, 1L NS Bolus, Total 1L D5. Ms. Piper is a 28YO F with PMHx POTS 2/2 Long-COVID and Anxiety who presents from [facility] due to persistent diarrhea x1 month. Patient states that since the end of January, she has had constant episodes of non-bloody diarrhea and persistent nausea. She has tried to take Peptobismol and adjust her diet to drink tea, Gatorade, and eat toast, bagel, applesauce, bone and chicken broth, and mashed bananas but nothing has ameliorated her symptoms. She notes that adjusting her diet to eat minimal amounts of the above foods may temporarily decrease the diarrheal episodes for a few days but it returns in a cyclical pattern. She also will try to eat foods and will have reflux-like symptoms where she immediately regurgitates what she took in. Most recently, her symptoms have become severe since Monday. Since then, she has felt very dehydrated and fatigued and went to [facility] ED on Tuesday due to feeling that she may pass out, at which point she was given fluids and sent home.    After returning home, she continued to feel nauseous and had bowel movements Q15 minutes noting that even when there was nothing else coming out, her bowel movements would consist of white mucus. This led to her going to [facility] ED on 2/25, where stool studies were done and labs are unremarkable (GI PCR was negative, C. difficile was negative).  She was given 3 L of IV fluid, was tolerating p.o., and thus sent home. After returning home, she continued to have inability to tolerate PO with persistent nausea and diarrhea and contacted her GI Dr. Deleon ([place]) who advised her to return to ED.     No recent travel or known sick contacts. Denies any new foods or herbal supplements, with no consumption of unpasteurized dairy products, raw or undercooked meat or fish. Denies any fevers, chills, vomiting, CP, numbness, tingling, or skin rashes though endorses band-like abdominal pain during defecation, abdominal bloating, sporadic joint pains to bilateral elbows and knees, and 15 pound unintentional weight loss.    Notably, in 2/23 patient had Norovirus with suspected Bacterial enterocolitis at which point she had a colonoscopy that was unremarkable. This past summer, she had episodes of bloody diarrhea with mucus and recently began following with GI Dr. Deleon. She has a family history significant for IBD on her father's side (Crohn's disease) and Proctitis in her sister.    ED COURSE  ·	Vitals: T 98F, /78, HR 79, RR 18, SpO2 97% on RA  ·	Labs significant for: AG 7, BUN <3, UA +Ketones, C.dif and GI PCR negative.   ·	Imaging: CT A/P negative  ·	Interventions: Maalox 30mL x2, Zofran 4mg x2, Pepcid 20mg x1, 1L NS Bolus, Total 1L D5. Ms. Piper is a 28YO F with PMHx POTS 2/2 Long-COVID and Anxiety who presents from [facility] due to persistent diarrhea x1 month. Patient states that since the end of January, she has had constant episodes of non-bloody diarrhea and persistent nausea. She has tried to take Peptobismol, Zofran, and adjust her diet to drink tea, Gatorade, and eat toast, bagel, applesauce, bone and chicken broth, and mashed bananas but nothing has ameliorated her symptoms. She notes that adjusting her diet to eat minimal amounts of the above foods may temporarily decrease the diarrheal episodes for a few days but it returns in a cyclical pattern. She also will try to eat foods and will have reflux-like symptoms where she immediately regurgitates what she took in. Most recently, her symptoms have become severe since Monday. Since then, she has felt very dehydrated and fatigued and went to [facility] ED on Tuesday due to feeling that she may pass out, at which point she was given fluids and sent home.    After returning home, she continued to feel nauseous and had bowel movements Q15 minutes noting that even when there was nothing else coming out, her bowel movements would consist of white mucus. This led to her going to [facility] ED on 2/25, where stool studies were done and labs are unremarkable (GI PCR was negative, C. difficile was negative).  She was given 3 L of IV fluid, was tolerating p.o., and thus sent home. After returning home, she continued to have inability to tolerate PO with persistent nausea and diarrhea and contacted her GI Dr. Deleon ([place]) who advised her to return to ED.     No recent travel or known sick contacts. Denies any new foods or herbal supplements, with no consumption of unpasteurized dairy products, raw or undercooked meat or fish. Denies any fevers, chills, vomiting, CP, numbness, tingling, or skin rashes though endorses band-like abdominal pain during defecation, abdominal bloating, sporadic joint pains to bilateral elbows and knees, and 15 pound unintentional weight loss.    Notably, in 2/23 patient had Norovirus with suspected Bacterial enterocolitis at which point she had a colonoscopy that was unremarkable. This past summer, she had episodes of bloody diarrhea with mucus and recently began following with GI Dr. Deleon. She has a family history significant for IBD on her father's side (Crohn's disease) and Proctitis in her sister.    ED COURSE  ·	Vitals: T 98F, /78, HR 79, RR 18, SpO2 97% on RA  ·	Labs significant for: AG 7, BUN <3, UA +Ketones, C.dif and GI PCR negative.   ·	Imaging: CT A/P negative  ·	Interventions: Maalox 30mL x2, Zofran 4mg x2, Pepcid 20mg x1, 1L NS Bolus, Total 1L D5. Ms. Piper is a 30YO F with PMHx POTS 2/2 Long-COVID and Anxiety who presents from [facility] due to persistent diarrhea x1 month and poor PO intake. Patient states that since the end of January, she has had constant episodes of non-bloody diarrhea and persistent nausea. She has tried to take Peptobismol, Zofran, and adjust her diet to drink tea, Gatorade, and eat toast, bagel, applesauce, bone and chicken broth, and mashed bananas but nothing has ameliorated her symptoms. She notes that adjusting her diet to eat minimal amounts of the above foods may temporarily decrease the diarrheal episodes for a few days but it returns in a cyclical pattern. She also will try to eat foods and will have reflux-like symptoms where she immediately regurgitates what she took in. Most recently, her symptoms have become severe since Monday. Since then, she has felt very dehydrated and fatigued and went to [facility] ED on Tuesday due to feeling that she may pass out, at which point she was given fluids and sent home.    After returning home, she continued to feel nauseous and had bowel movements Q15 minutes noting that even when there was nothing else coming out, her bowel movements would consist of white mucus. This led to her going to [facility] ED on 2/25, where stool studies were done and labs are unremarkable (GI PCR was negative, C. difficile was negative).  She was given 3 L of IV fluid, was tolerating p.o., and thus sent home. After returning home, she continued to have inability to tolerate PO with persistent nausea and diarrhea and contacted her GI Dr. Deleon ([place]) who advised her to return to ED.     No recent travel or known sick contacts. Denies any new foods or herbal supplements, with no consumption of unpasteurized dairy products, raw or undercooked meat or fish. Denies any fevers, chills, vomiting, CP, numbness, tingling, or skin rashes though endorses band-like abdominal pain during defecation, abdominal bloating, sporadic joint pains to bilateral elbows and knees, and 15 pound unintentional weight loss.    Notably, in 2/23 patient had Norovirus with suspected Bacterial enterocolitis at which point she had a colonoscopy that was unremarkable. This past summer, she had episodes of bloody diarrhea with mucus and recently began following with GI Dr. Deleon. She has a family history significant for IBD on her father's side (Crohn's disease) and Proctitis in her sister.    ED COURSE  ·	Vitals: T 98F, /78, HR 79, RR 18, SpO2 97% on RA  ·	Labs significant for: AG 7, BUN <3, UA +Ketones, C.dif and GI PCR negative.   ·	Imaging: CT A/P negative  ·	Interventions: Maalox 30mL x2, Zofran 4mg x2, Pepcid 20mg x1, 1L NS Bolus, Total 1L D5.

## 2025-02-28 LAB
CULTURE RESULTS: SIGNIFICANT CHANGE UP
SPECIMEN SOURCE: SIGNIFICANT CHANGE UP

## 2025-03-04 DIAGNOSIS — Z86.16 PERSONAL HISTORY OF COVID-19: ICD-10-CM

## 2025-03-04 DIAGNOSIS — R19.7 DIARRHEA, UNSPECIFIED: ICD-10-CM

## 2025-03-04 DIAGNOSIS — Z91.010 ALLERGY TO PEANUTS: ICD-10-CM

## 2025-03-04 DIAGNOSIS — G90.A POSTURAL ORTHOSTATIC TACHYCARDIA SYNDROME [POTS]: ICD-10-CM

## 2025-03-04 DIAGNOSIS — Z88.8 ALLERGY STATUS TO OTHER DRUGS, MEDICAMENTS AND BIOLOGICAL SUBSTANCES: ICD-10-CM

## 2025-03-04 DIAGNOSIS — K21.9 GASTRO-ESOPHAGEAL REFLUX DISEASE WITHOUT ESOPHAGITIS: ICD-10-CM

## 2025-05-18 ENCOUNTER — HOSPITAL ENCOUNTER (EMERGENCY)
Facility: HOSPITAL | Age: 30
Discharge: HOME | End: 2025-05-18
Attending: EMERGENCY MEDICINE | Admitting: EMERGENCY MEDICINE
Payer: COMMERCIAL

## 2025-05-18 ENCOUNTER — APPOINTMENT (EMERGENCY)
Dept: RADIOLOGY | Facility: HOSPITAL | Age: 30
End: 2025-05-18
Payer: COMMERCIAL

## 2025-05-18 VITALS
BODY MASS INDEX: 21.16 KG/M2 | OXYGEN SATURATION: 99 % | HEIGHT: 65 IN | RESPIRATION RATE: 16 BRPM | HEART RATE: 62 BPM | DIASTOLIC BLOOD PRESSURE: 76 MMHG | TEMPERATURE: 98.3 F | WEIGHT: 127 LBS | SYSTOLIC BLOOD PRESSURE: 108 MMHG

## 2025-05-18 DIAGNOSIS — R07.9 CHEST PAIN, UNSPECIFIED TYPE: Primary | ICD-10-CM

## 2025-05-18 LAB
ALBUMIN SERPL-MCNC: 4.3 G/DL (ref 3.5–5.7)
ALP SERPL-CCNC: 54 IU/L (ref 34–125)
ALT SERPL-CCNC: 13 IU/L (ref 7–52)
ANION GAP SERPL CALC-SCNC: 7 MEQ/L (ref 3–15)
AST SERPL-CCNC: 16 IU/L (ref 13–39)
BASOPHILS # BLD: 0.03 K/UL (ref 0.01–0.1)
BASOPHILS NFR BLD: 0.4 %
BILIRUB SERPL-MCNC: 0.3 MG/DL (ref 0.3–1.2)
BUN SERPL-MCNC: 16 MG/DL (ref 7–25)
CALCIUM SERPL-MCNC: 9.2 MG/DL (ref 8.6–10.3)
CHLORIDE SERPL-SCNC: 104 MEQ/L (ref 98–107)
CO2 SERPL-SCNC: 25 MEQ/L (ref 21–31)
CREAT SERPL-MCNC: 0.6 MG/DL (ref 0.6–1.2)
DIFFERENTIAL METHOD BLD: NORMAL
EGFRCR SERPLBLD CKD-EPI 2021: >60 ML/MIN/1.73M*2
EOSINOPHIL # BLD: 0.13 K/UL (ref 0.04–0.36)
EOSINOPHIL NFR BLD: 1.5 %
ERYTHROCYTE [DISTWIDTH] IN BLOOD BY AUTOMATED COUNT: 12.4 % (ref 11.7–14.4)
GLUCOSE SERPL-MCNC: 94 MG/DL (ref 70–99)
HCT VFR BLD AUTO: 37.7 % (ref 35–45)
HGB BLD-MCNC: 12.6 G/DL (ref 11.8–15.7)
IMM GRANULOCYTES # BLD AUTO: 0.01 K/UL (ref 0–0.08)
IMM GRANULOCYTES NFR BLD AUTO: 0.1 %
LYMPHOCYTES # BLD: 3.34 K/UL (ref 1.2–3.5)
LYMPHOCYTES NFR BLD: 39.5 %
MCH RBC QN AUTO: 30.1 PG (ref 28–33.2)
MCHC RBC AUTO-ENTMCNC: 33.4 G/DL (ref 32.2–35.5)
MCV RBC AUTO: 90 FL (ref 83–98)
MONOCYTES # BLD: 0.52 K/UL (ref 0.28–0.8)
MONOCYTES NFR BLD: 6.1 %
NEUTROPHILS # BLD: 4.43 K/UL (ref 1.7–7)
NEUTS SEG NFR BLD: 52.4 %
NRBC BLD-RTO: 0 %
PLATELET # BLD AUTO: 281 K/UL (ref 150–369)
PMV BLD AUTO: 9.7 FL (ref 9.4–12.3)
POTASSIUM SERPL-SCNC: 4 MEQ/L (ref 3.5–5.1)
PROT SERPL-MCNC: 7.5 G/DL (ref 6–8.2)
RBC # BLD AUTO: 4.19 M/UL (ref 3.93–5.22)
SODIUM SERPL-SCNC: 136 MEQ/L (ref 136–145)
TROPONIN I SERPL HS-MCNC: <2 PG/ML
WBC # BLD AUTO: 8.46 K/UL (ref 3.8–10.5)

## 2025-05-18 PROCEDURE — 84484 ASSAY OF TROPONIN QUANT: CPT | Performed by: EMERGENCY MEDICINE

## 2025-05-18 PROCEDURE — 80053 COMPREHEN METABOLIC PANEL: CPT | Performed by: EMERGENCY MEDICINE

## 2025-05-18 PROCEDURE — 36415 COLL VENOUS BLD VENIPUNCTURE: CPT

## 2025-05-18 PROCEDURE — 71046 X-RAY EXAM CHEST 2 VIEWS: CPT

## 2025-05-18 PROCEDURE — 85025 COMPLETE CBC W/AUTO DIFF WBC: CPT | Performed by: EMERGENCY MEDICINE

## 2025-05-18 PROCEDURE — 93005 ELECTROCARDIOGRAM TRACING: CPT

## 2025-05-18 PROCEDURE — 85025 COMPLETE CBC W/AUTO DIFF WBC: CPT

## 2025-05-18 PROCEDURE — 84484 ASSAY OF TROPONIN QUANT: CPT

## 2025-05-18 PROCEDURE — 93005 ELECTROCARDIOGRAM TRACING: CPT | Performed by: EMERGENCY MEDICINE

## 2025-05-18 PROCEDURE — 80053 COMPREHEN METABOLIC PANEL: CPT

## 2025-05-18 PROCEDURE — 99283 EMERGENCY DEPT VISIT LOW MDM: CPT | Mod: 25

## 2025-05-19 LAB
ATRIAL RATE: 75
P AXIS: 59
PR INTERVAL: 144
QRS DURATION: 80
QT INTERVAL: 380
QTC CALCULATION(BAZETT): 424
R AXIS: 76
T WAVE AXIS: 56
VENTRICULAR RATE: 75

## 2025-05-19 PROCEDURE — 93010 ELECTROCARDIOGRAM REPORT: CPT | Performed by: INTERNAL MEDICINE

## 2025-08-12 ENCOUNTER — NON-APPOINTMENT (OUTPATIENT)
Age: 30
End: 2025-08-12